# Patient Record
Sex: FEMALE | Race: BLACK OR AFRICAN AMERICAN | NOT HISPANIC OR LATINO | URBAN - METROPOLITAN AREA
[De-identification: names, ages, dates, MRNs, and addresses within clinical notes are randomized per-mention and may not be internally consistent; named-entity substitution may affect disease eponyms.]

---

## 2019-10-19 ENCOUNTER — EMERGENCY (EMERGENCY)
Facility: HOSPITAL | Age: 69
LOS: 1 days | Discharge: HOME | End: 2019-10-19
Attending: EMERGENCY MEDICINE
Payer: COMMERCIAL

## 2019-10-19 VITALS
WEIGHT: 192.02 LBS | DIASTOLIC BLOOD PRESSURE: 82 MMHG | HEART RATE: 62 BPM | SYSTOLIC BLOOD PRESSURE: 133 MMHG | RESPIRATION RATE: 18 BRPM | OXYGEN SATURATION: 100 %

## 2019-10-19 VITALS
OXYGEN SATURATION: 100 % | RESPIRATION RATE: 16 BRPM | SYSTOLIC BLOOD PRESSURE: 136 MMHG | TEMPERATURE: 98 F | HEART RATE: 62 BPM | DIASTOLIC BLOOD PRESSURE: 62 MMHG

## 2019-10-19 LAB
ALBUMIN SERPL ELPH-MCNC: 3.3 G/DL — LOW (ref 3.5–5)
ALP SERPL-CCNC: 69 U/L — SIGNIFICANT CHANGE UP (ref 40–120)
ALT FLD-CCNC: 19 U/L DA — SIGNIFICANT CHANGE UP (ref 10–60)
ANION GAP SERPL CALC-SCNC: 6 MMOL/L — SIGNIFICANT CHANGE UP (ref 5–17)
AST SERPL-CCNC: 11 U/L — SIGNIFICANT CHANGE UP (ref 10–40)
BASOPHILS # BLD AUTO: 0.02 K/UL — SIGNIFICANT CHANGE UP (ref 0–0.2)
BASOPHILS NFR BLD AUTO: 0.2 % — SIGNIFICANT CHANGE UP (ref 0–2)
BILIRUB SERPL-MCNC: 0.4 MG/DL — SIGNIFICANT CHANGE UP (ref 0.2–1.2)
BUN SERPL-MCNC: 24 MG/DL — HIGH (ref 7–18)
CALCIUM SERPL-MCNC: 9.1 MG/DL — SIGNIFICANT CHANGE UP (ref 8.4–10.5)
CHLORIDE SERPL-SCNC: 99 MMOL/L — SIGNIFICANT CHANGE UP (ref 96–108)
CO2 SERPL-SCNC: 31 MMOL/L — SIGNIFICANT CHANGE UP (ref 22–31)
CREAT SERPL-MCNC: 1.15 MG/DL — SIGNIFICANT CHANGE UP (ref 0.5–1.3)
EOSINOPHIL # BLD AUTO: 0.02 K/UL — SIGNIFICANT CHANGE UP (ref 0–0.5)
EOSINOPHIL NFR BLD AUTO: 0.2 % — SIGNIFICANT CHANGE UP (ref 0–6)
GLUCOSE SERPL-MCNC: 212 MG/DL — HIGH (ref 70–99)
HCT VFR BLD CALC: 40 % — SIGNIFICANT CHANGE UP (ref 34.5–45)
HGB BLD-MCNC: 12.9 G/DL — SIGNIFICANT CHANGE UP (ref 11.5–15.5)
IMM GRANULOCYTES NFR BLD AUTO: 0.3 % — SIGNIFICANT CHANGE UP (ref 0–1.5)
LYMPHOCYTES # BLD AUTO: 1.16 K/UL — SIGNIFICANT CHANGE UP (ref 1–3.3)
LYMPHOCYTES # BLD AUTO: 13.1 % — SIGNIFICANT CHANGE UP (ref 13–44)
MCHC RBC-ENTMCNC: 29 PG — SIGNIFICANT CHANGE UP (ref 27–34)
MCHC RBC-ENTMCNC: 32.3 GM/DL — SIGNIFICANT CHANGE UP (ref 32–36)
MCV RBC AUTO: 89.9 FL — SIGNIFICANT CHANGE UP (ref 80–100)
MONOCYTES # BLD AUTO: 0.49 K/UL — SIGNIFICANT CHANGE UP (ref 0–0.9)
MONOCYTES NFR BLD AUTO: 5.5 % — SIGNIFICANT CHANGE UP (ref 2–14)
NEUTROPHILS # BLD AUTO: 7.13 K/UL — SIGNIFICANT CHANGE UP (ref 1.8–7.4)
NEUTROPHILS NFR BLD AUTO: 80.7 % — HIGH (ref 43–77)
NRBC # BLD: 0 /100 WBCS — SIGNIFICANT CHANGE UP (ref 0–0)
PLATELET # BLD AUTO: 225 K/UL — SIGNIFICANT CHANGE UP (ref 150–400)
POTASSIUM SERPL-MCNC: 3.8 MMOL/L — SIGNIFICANT CHANGE UP (ref 3.5–5.3)
POTASSIUM SERPL-SCNC: 3.8 MMOL/L — SIGNIFICANT CHANGE UP (ref 3.5–5.3)
PROT SERPL-MCNC: 7.5 G/DL — SIGNIFICANT CHANGE UP (ref 6–8.3)
RBC # BLD: 4.45 M/UL — SIGNIFICANT CHANGE UP (ref 3.8–5.2)
RBC # FLD: 13.9 % — SIGNIFICANT CHANGE UP (ref 10.3–14.5)
SODIUM SERPL-SCNC: 136 MMOL/L — SIGNIFICANT CHANGE UP (ref 135–145)
TROPONIN I SERPL-MCNC: <0.015 NG/ML — SIGNIFICANT CHANGE UP (ref 0–0.04)
WBC # BLD: 8.85 K/UL — SIGNIFICANT CHANGE UP (ref 3.8–10.5)
WBC # FLD AUTO: 8.85 K/UL — SIGNIFICANT CHANGE UP (ref 3.8–10.5)

## 2019-10-19 PROCEDURE — 93005 ELECTROCARDIOGRAM TRACING: CPT

## 2019-10-19 PROCEDURE — 70450 CT HEAD/BRAIN W/O DYE: CPT | Mod: 26

## 2019-10-19 PROCEDURE — 99284 EMERGENCY DEPT VISIT MOD MDM: CPT | Mod: 25

## 2019-10-19 PROCEDURE — 36415 COLL VENOUS BLD VENIPUNCTURE: CPT

## 2019-10-19 PROCEDURE — 70450 CT HEAD/BRAIN W/O DYE: CPT

## 2019-10-19 PROCEDURE — 85027 COMPLETE CBC AUTOMATED: CPT

## 2019-10-19 PROCEDURE — 84484 ASSAY OF TROPONIN QUANT: CPT

## 2019-10-19 PROCEDURE — 99285 EMERGENCY DEPT VISIT HI MDM: CPT

## 2019-10-19 PROCEDURE — 80053 COMPREHEN METABOLIC PANEL: CPT

## 2019-10-19 PROCEDURE — 82962 GLUCOSE BLOOD TEST: CPT

## 2019-10-19 RX ORDER — INSULIN GLARGINE 100 [IU]/ML
0 INJECTION, SOLUTION SUBCUTANEOUS
Qty: 0 | Refills: 0 | DISCHARGE

## 2019-10-19 NOTE — ED PROVIDER NOTE - PATIENT PORTAL LINK FT
You can access the FollowMyHealth Patient Portal offered by [facility] by registering at the following website: [email or web address]. By joining [facility]’s FollowMyHealth portal, you will also be able to view your health information using other applications (apps) compatible with our system.

## 2019-10-19 NOTE — ED PROVIDER NOTE - OBJECTIVE STATEMENT
68 year old female PMh HTN, DM, HLD coming in with episode of hypoglycemia. As per pts faimly they heard patient fall from bed and called EMS. EMS found fingerstick to be 40 and gave IV glucose. after pt returned to baseline. at this time the pt denies all complaints. states this has happened before. for DM takes insulin, metformin, and trulicity. states no recent changes in DM meds. unsure if she at a substantial dinner.

## 2019-10-19 NOTE — ED PROVIDER NOTE - CLINICAL SUMMARY MEDICAL DECISION MAKING FREE TEXT BOX
68 year old female with hypoglycemia. vitals WNL. PE as above.  labs, ecg, ct head, reassess 68 year old female with hypoglycemia. vitals WNL. PE as above.  labs, ecg, ct head, reassess  labs are unremarkable. ecg with no acute ischemic changes. ct head negative. glucose WNL in ED. pt tolerating PO. advised maintin PO intake. dc home. f/u PMD. return precautions given.

## 2020-07-17 PROBLEM — Z00.00 ENCOUNTER FOR PREVENTIVE HEALTH EXAMINATION: Status: ACTIVE | Noted: 2020-07-17

## 2020-07-24 ENCOUNTER — APPOINTMENT (OUTPATIENT)
Dept: NEUROLOGY | Facility: CLINIC | Age: 70
End: 2020-07-24
Payer: MEDICARE

## 2020-07-24 VITALS
BODY MASS INDEX: 31.82 KG/M2 | WEIGHT: 191 LBS | SYSTOLIC BLOOD PRESSURE: 120 MMHG | DIASTOLIC BLOOD PRESSURE: 68 MMHG | TEMPERATURE: 98.1 F | OXYGEN SATURATION: 98 % | HEART RATE: 88 BPM | HEIGHT: 65 IN

## 2020-07-24 DIAGNOSIS — Z78.9 OTHER SPECIFIED HEALTH STATUS: ICD-10-CM

## 2020-07-24 DIAGNOSIS — Z82.49 FAMILY HISTORY OF ISCHEMIC HEART DISEASE AND OTHER DISEASES OF THE CIRCULATORY SYSTEM: ICD-10-CM

## 2020-07-24 DIAGNOSIS — Z86.79 PERSONAL HISTORY OF OTHER DISEASES OF THE CIRCULATORY SYSTEM: ICD-10-CM

## 2020-07-24 DIAGNOSIS — Z86.39 PERSONAL HISTORY OF OTHER ENDOCRINE, NUTRITIONAL AND METABOLIC DISEASE: ICD-10-CM

## 2020-07-24 DIAGNOSIS — Z83.3 FAMILY HISTORY OF DIABETES MELLITUS: ICD-10-CM

## 2020-07-24 DIAGNOSIS — R53.1 WEAKNESS: ICD-10-CM

## 2020-07-24 PROCEDURE — 99205 OFFICE O/P NEW HI 60 MIN: CPT

## 2020-07-24 NOTE — CONSULT LETTER
[Dear  ___] : Dear  [unfilled], [Consult Letter:] : I had the pleasure of evaluating your patient, [unfilled]. [Please see my note below.] : Please see my note below. [Consult Closing:] : Thank you very much for allowing me to participate in the care of this patient.  If you have any questions, please do not hesitate to contact me. [Sincerely,] : Sincerely, [FreeTextEntry3] : Kacie Corrales MD, MPH\par

## 2020-07-24 NOTE — DATA REVIEWED
[de-identified] : [facility] images reviwe 2019: WMID\par Ventricular Rate 53 BPM\par \par per report mri  brain 2018: small left cerebella [id number]: severe cts bl\par Atrial Rate 53 BPM\par \par P-R Interval 162 ms\par \par QRS Duration 82 ms\par \par Q-T Interval 504 ms\par \par QTC Calculation(Bezet) 472 ms\par \par P Axis 46 degrees\par \par R Axis -4 degrees\par \par T Axis 13 degrees\par \par Diagnosis Line *** Poor data quality, interpretation may be adversely affected\par Sinus bradycardia\par borderline QT\par Minimal voltage criteria for LVH, may be normal variant\par Borderline ECG\par \par Confirmed by RUKHSANA WATERMAN, JULIANNE ([id number]) on 10/21/2019 1:21:37 PM\par \par Glu 2019:212

## 2020-07-24 NOTE — HISTORY OF PRESENT ILLNESS
[FreeTextEntry1] : The patient has DM and here to establish care.  The patient had an event of LOC about 20+years ago, she was taken to [facility] and was told that she had a stroke.  She was started on Plavix, she is also on Pravastatin 5mg for stroke prevention.  She has not had any further stroke symptoms.\par \par She is right handed and has had numbness and tingling in both hands for years.  She also has cramping in the hands as well as right forearm pain.  She has received injections in her hands without improvement.  She notes that her hands are worsening.

## 2020-07-24 NOTE — ASSESSMENT
[FreeTextEntry1] : Lacunar stroke\par will check LDL and HbA1C\par will cw Plavix and Provastatin 5mg for now, will adjust statin dose if needed based on labs\par \par Bilateral numbness and tingling in the hands, found to have bl APB weakness, also c/o right arm pain, concerning for CTS\par will get ncs.emg and will direct care bases on results

## 2020-07-24 NOTE — REVIEW OF SYSTEMS
[Eyesight Problems] : eyesight problems [As Noted in HPI] : as noted in HPI [Fever] : no fever [Loss Of Hearing] : no hearing loss [Anxiety] : no anxiety [Chest Pain] : no chest pain [Shortness Of Breath] : no shortness of breath [Vomiting] : no vomiting [Itching] : no itching [Incontinence] : no incontinence [Joint Pain] : no joint pain [Muscle Weakness] : no muscle weakness [Easy Bleeding] : no tendency for easy bleeding

## 2020-07-24 NOTE — PHYSICAL EXAM
[General Appearance - In No Acute Distress] : in no acute distress [General Appearance - Alert] : alert [Person] : oriented to person [Place] : oriented to place [Concentration Intact] : normal concentrating ability [Time] : oriented to time [Registration Intact] : recent registration memory intact [Repeating Phrases] : no difficulty repeating a phrase [Naming Objects] : no difficulty naming common objects [Visual Intact] : visual attention was ~T not ~L decreased [Comprehension] : comprehension intact [Vocabulary] : adequate range of vocabulary [Fluency] : fluency intact [Cranial Nerves Oculomotor (III)] : extraocular motion intact [Cranial Nerves Facial (VII)] : face symmetrical [Cranial Nerves Trigeminal (V)] : facial sensation intact symmetrically [Cranial Nerves Optic (II)] : visual acuity intact bilaterally,  visual fields full to confrontation, pupils equal round and reactive to light [Cranial Nerves Hypoglossal (XII)] : there was no tongue deviation with protrusion [Cranial Nerves Vestibulocochlear (VIII)] : hearing was intact bilaterally [Cranial Nerves Accessory (XI - Cranial And Spinal)] : head turning and shoulder shrug symmetric [Cranial Nerves Glossopharyngeal (IX)] : tongue and palate midline [Motor Tone] : muscle tone was normal in all four extremities [Involuntary Movements] : no involuntary movements were seen [Sensation Tactile Decrease] : light touch was intact [Abnormal Walk] : normal gait [Balance] : balance was intact [1+] : Ankle jerk left 1+ [Full Pulse] : the pedal pulses are present [Coordination - Dysmetria Impaired Finger-to-Nose Bilateral] : not present [Plantar Reflex Right Only] : normal on the right [Coordination - Dysmetria Impaired Heel-to-Shin Bilateral] : not present [Plantar Reflex Left Only] : normal on the left [FreeTextEntry6] : strength full except bl APB 4/5 [FreeTextEntry5] : fundi not visualized

## 2020-07-27 LAB
CHOLEST SERPL-MCNC: 103 MG/DL
CHOLEST/HDLC SERPL: 2.5 RATIO
ESTIMATED AVERAGE GLUCOSE: 180 MG/DL
HBA1C MFR BLD HPLC: 7.9 %
HDLC SERPL-MCNC: 42 MG/DL
LDLC SERPL CALC-MCNC: 45 MG/DL
TRIGL SERPL-MCNC: 82 MG/DL

## 2020-10-22 ENCOUNTER — APPOINTMENT (OUTPATIENT)
Dept: NEUROLOGY | Facility: CLINIC | Age: 70
End: 2020-10-22
Payer: MEDICARE

## 2020-10-22 VITALS
WEIGHT: 191 LBS | DIASTOLIC BLOOD PRESSURE: 74 MMHG | HEIGHT: 65 IN | TEMPERATURE: 97.2 F | SYSTOLIC BLOOD PRESSURE: 158 MMHG | OXYGEN SATURATION: 100 % | HEART RATE: 77 BPM | BODY MASS INDEX: 31.82 KG/M2

## 2020-10-22 PROCEDURE — 95886 MUSC TEST DONE W/N TEST COMP: CPT | Mod: 50

## 2020-10-22 PROCEDURE — 95913 NRV CNDJ TEST 13/> STUDIES: CPT

## 2020-10-29 ENCOUNTER — APPOINTMENT (OUTPATIENT)
Dept: NEUROLOGY | Facility: CLINIC | Age: 70
End: 2020-10-29
Payer: MEDICARE

## 2020-10-29 VITALS — OXYGEN SATURATION: 98 % | DIASTOLIC BLOOD PRESSURE: 69 MMHG | HEART RATE: 69 BPM | SYSTOLIC BLOOD PRESSURE: 133 MMHG

## 2020-10-29 VITALS
BODY MASS INDEX: 32.65 KG/M2 | HEIGHT: 65 IN | WEIGHT: 196 LBS | DIASTOLIC BLOOD PRESSURE: 76 MMHG | HEART RATE: 75 BPM | SYSTOLIC BLOOD PRESSURE: 137 MMHG | TEMPERATURE: 98.3 F

## 2020-10-29 VITALS — DIASTOLIC BLOOD PRESSURE: 68 MMHG | HEART RATE: 89 BPM | OXYGEN SATURATION: 98 % | SYSTOLIC BLOOD PRESSURE: 127 MMHG

## 2020-10-29 VITALS — DIASTOLIC BLOOD PRESSURE: 67 MMHG | HEART RATE: 70 BPM | SYSTOLIC BLOOD PRESSURE: 125 MMHG | OXYGEN SATURATION: 98 %

## 2020-10-29 DIAGNOSIS — N39.9 DISORDER OF URINARY SYSTEM, UNSPECIFIED: ICD-10-CM

## 2020-10-29 PROCEDURE — 99215 OFFICE O/P EST HI 40 MIN: CPT

## 2020-10-29 PROCEDURE — 99072 ADDL SUPL MATRL&STAF TM PHE: CPT

## 2020-10-29 NOTE — DATA REVIEWED
[de-identified] : ncs/emg: moderate CTS with demyelinating features bilaterally\par LDL 45, HbA1C 7.9

## 2020-10-29 NOTE — HISTORY OF PRESENT ILLNESS
[FreeTextEntry1] : The patient has DM and here to establish care. The patient had an event of LOC about 20+years ago, she was taken to [facility] and was told that she had a stroke. She was started on Plavix, she is also on Pravastatin 5mg for stroke prevention. She has not had any further stroke symptoms.\par \par She is right handed and has had numbness and tingling in both hands for years. She also has cramping in the hands as well as right forearm pain. She has received injections in her hands without improvement. She notes that her hands are worsening. \par \par The patient also feels dizziness without any focal neurological signs.  Started few weeks ago, noted on walking.  Drinks about 4 cups of water daily.  Has been cards for the dizziness and has not been noted to have any cardiac reasons for the dizziness.

## 2020-10-29 NOTE — PHYSICAL EXAM
[General Appearance - Alert] : alert [General Appearance - In No Acute Distress] : in no acute distress [Person] : oriented to person [Place] : oriented to place [Time] : oriented to time [Registration Intact] : recent registration memory intact [Concentration Intact] : normal concentrating ability [Naming Objects] : no difficulty naming common objects [Repeating Phrases] : no difficulty repeating a phrase [Fluency] : fluency intact [Comprehension] : comprehension intact [Vocabulary] : adequate range of vocabulary [Cranial Nerves Optic (II)] : visual acuity intact bilaterally,  visual fields full to confrontation, pupils equal round and reactive to light [Cranial Nerves Oculomotor (III)] : extraocular motion intact [Cranial Nerves Trigeminal (V)] : facial sensation intact symmetrically [Cranial Nerves Facial (VII)] : face symmetrical [Motor Tone] : muscle tone was normal in all four extremities [Motor Strength] : muscle strength was normal in all four extremities [Sensation Tactile Decrease] : light touch was intact [Abnormal Walk] : normal gait [Balance] : balance was intact [Coordination - Dysmetria Impaired Finger-to-Nose Bilateral] : not present [Coordination - Dysmetria Impaired Heel-to-Shin Bilateral] : not present

## 2020-10-29 NOTE — ASSESSMENT
[FreeTextEntry1] : Lacunar stroke\par LDL at goal, HbA1C elevated\par will cw Plavix and Pravastatin 5mg\par \par Bilateral numbness and tingling in the hands, found to have bl APB weakness, also c/o right arm pain with ncs.emg showing moderate CTS with demyelinating features bilaterally.  WIll refer to hand surgery for median nerve release.\par \par Dizziness, likely due to orthostasis, encouraged increased water intake.  Will get MRI brain and CD to r/o cva and hypoperfusion.\par \par The patient has increased urination at night, will refer to urology.\par \par

## 2020-10-30 ENCOUNTER — APPOINTMENT (OUTPATIENT)
Dept: VASCULAR SURGERY | Facility: CLINIC | Age: 70
End: 2020-10-30
Payer: MEDICARE

## 2020-10-30 VITALS
BODY MASS INDEX: 32.65 KG/M2 | WEIGHT: 196 LBS | SYSTOLIC BLOOD PRESSURE: 165 MMHG | DIASTOLIC BLOOD PRESSURE: 66 MMHG | HEIGHT: 65 IN | HEART RATE: 76 BPM

## 2020-10-30 VITALS — TEMPERATURE: 97.2 F

## 2020-10-30 DIAGNOSIS — M25.473 EFFUSION, UNSPECIFIED ANKLE: ICD-10-CM

## 2020-10-30 DIAGNOSIS — Z86.39 PERSONAL HISTORY OF OTHER ENDOCRINE, NUTRITIONAL AND METABOLIC DISEASE: ICD-10-CM

## 2020-10-30 DIAGNOSIS — Z86.73 PERSONAL HISTORY OF TRANSIENT ISCHEMIC ATTACK (TIA), AND CEREBRAL INFARCTION W/OUT RESIDUAL DEFICITS: ICD-10-CM

## 2020-10-30 PROCEDURE — 99203 OFFICE O/P NEW LOW 30 MIN: CPT

## 2020-10-30 PROCEDURE — 99072 ADDL SUPL MATRL&STAF TM PHE: CPT

## 2020-10-30 PROCEDURE — 93970 EXTREMITY STUDY: CPT

## 2020-10-30 NOTE — ASSESSMENT
[FreeTextEntry1] : 68 yo female with history of dm, htn, tia, venous insufficiency s/p b/l gsv rf ablation in the past presents for evaluation of persistent lower extremity edema and pain. \par \par venous duplex shows no evidence of dvt, svt or deep insufficiency, gsv s/p ablation b/l thigh with short segment of reflux of the right proximal calf and varicose veins in the left calf\par \par at this time would recommend compression and elevation \par pt to follow up as needed

## 2020-10-30 NOTE — CONSULT LETTER
[Dear  ___] : Dear  [unfilled], [Consult Letter:] : I had the pleasure of evaluating your patient, [unfilled]. [Please see my note below.] : Please see my note below. [Consult Closing:] : Thank you very much for allowing me to participate in the care of this patient.  If you have any questions, please do not hesitate to contact me. [Sincerely,] : Sincerely, [FreeTextEntry3] : Maximiliano Benton M.D., F.GUALBERTOS., R.P.ANJALI.I.\par  of Vascular Surgery\par Assistant Professor of Radiology\par Director of Endovascular Program/ Vascular Access Center\par [facility]

## 2020-10-30 NOTE — PHYSICAL EXAM
[2+] : left 2+ [Ankle Swelling (On Exam)] : present [Ankle Swelling On The Right] : mild [] : bilaterally [Ankle Swelling Bilaterally] : severe [No Rash or Lesion] : No rash or lesion [Alert] : alert [Calm] : calm [JVD] : no jugular venous distention  [Normal Breath Sounds] : Normal breath sounds [Normal Heart Sounds] : normal heart sounds [Varicose Veins Of Lower Extremities] : not present [Abdomen Masses] : No abdominal masses [Skin Ulcer] : no ulcer [de-identified] : appears well  [de-identified] : no calf tenderness \par motor intact b/l lower extremities, muscle strength 5/5 with dorsi and plantar flexion\par  [de-identified] : sensation intact b/l lower extremities

## 2020-10-30 NOTE — HISTORY OF PRESENT ILLNESS
[FreeTextEntry1] : 68 yo female with history of dm, htn, tia, venous insufficiency s/p b/l gsv rf ablation in the past presents for evaluation of persistent lower extremity edema and pain.  pt states that she is starting to feel the same pain that she was having prior to the ablations.  she reports occasional cramping in the lower extremities and a fatigued felling in the lower extremities.  pt denies any history of dvt, ulcers or bleeding.  pt states that she elevates her legs at night with only minimal relief.  she does not wear compression stockings.\par pt states that she is able to walk about 2 blocks prior to feeling fatigued.  pt denies any cramping pain when she ambulates or pain that wakes her in the evening

## 2020-11-05 ENCOUNTER — APPOINTMENT (OUTPATIENT)
Dept: UROLOGY | Facility: CLINIC | Age: 70
End: 2020-11-05
Payer: MEDICARE

## 2020-11-05 VITALS
BODY MASS INDEX: 31.99 KG/M2 | HEIGHT: 65 IN | TEMPERATURE: 98.3 F | DIASTOLIC BLOOD PRESSURE: 66 MMHG | HEART RATE: 82 BPM | WEIGHT: 192 LBS | SYSTOLIC BLOOD PRESSURE: 119 MMHG

## 2020-11-05 DIAGNOSIS — Z86.39 PERSONAL HISTORY OF OTHER ENDOCRINE, NUTRITIONAL AND METABOLIC DISEASE: ICD-10-CM

## 2020-11-05 DIAGNOSIS — R35.1 NOCTURIA: ICD-10-CM

## 2020-11-05 PROCEDURE — 99203 OFFICE O/P NEW LOW 30 MIN: CPT

## 2020-11-05 PROCEDURE — 99072 ADDL SUPL MATRL&STAF TM PHE: CPT

## 2020-11-08 NOTE — ASSESSMENT
[FreeTextEntry1] : 70 yo F with nocturia\par \par - PVR = 0ml\par - Discussed possible etiologies for LUTS. Discussed ways to manage them including behavioral modifications such as adequate hydration, controlling constipation, restricting fluids in the evening. Also discussed changing timing of her lasix as this seems to be contirbuting to her symptoms\par - UA, culture

## 2020-11-08 NOTE — HISTORY OF PRESENT ILLNESS
[FreeTextEntry1] : 70 yo F presents with nocturia\par Voids 3 times per day, but nocturia 4-5 times/night\par Notices that when she doesn't take her lasix, nocturia much improved\par No other significant LUTS, no incontinence\par no dysuria, no gross hematuria\par no recurrent UTI\par Drinks 4 cups of water, 1 cup of tea daily\par normal bowel movements\par 2 children, \par LMP = 10 yrs ago

## 2020-11-08 NOTE — PHYSICAL EXAM
[General Appearance - Well Developed] : well developed [General Appearance - Well Nourished] : well nourished [Normal Appearance] : normal appearance [Well Groomed] : well groomed [General Appearance - In No Acute Distress] : no acute distress [Edema] : no peripheral edema [Respiration, Rhythm And Depth] : normal respiratory rhythm and effort [Exaggerated Use Of Accessory Muscles For Inspiration] : no accessory muscle use [Abdomen Soft] : soft [Abdomen Tenderness] : non-tender [Costovertebral Angle Tenderness] : no ~M costovertebral angle tenderness [Urethral Meatus] : normal urethra [Urinary Bladder Findings] : the bladder was normal on palpation [External Female Genitalia] : normal external genitalia [Normal Station and Gait] : the gait and station were normal for the patient's age [] : no rash [No Focal Deficits] : no focal deficits [Oriented To Time, Place, And Person] : oriented to person, place, and time [Affect] : the affect was normal [Mood] : the mood was normal [Not Anxious] : not anxious [No Palpable Adenopathy] : no palpable adenopathy [FreeTextEntry1] : mild vaginal atrophy, neg CST, no prolapse

## 2020-11-30 LAB
APPEARANCE: CLEAR
BACTERIA UR CULT: NORMAL
BACTERIA: NEGATIVE
BILIRUBIN URINE: NEGATIVE
BLOOD URINE: NEGATIVE
COLOR: YELLOW
GLUCOSE QUALITATIVE U: NEGATIVE
HYALINE CASTS: 2 /LPF
KETONES URINE: NEGATIVE
LEUKOCYTE ESTERASE URINE: NEGATIVE
MICROSCOPIC-UA: NORMAL
NITRITE URINE: NEGATIVE
PH URINE: 7.5
PROTEIN URINE: NORMAL
RED BLOOD CELLS URINE: 1 /HPF
SPECIFIC GRAVITY URINE: 1.02
SQUAMOUS EPITHELIAL CELLS: 1 /HPF
UROBILINOGEN URINE: NORMAL
WHITE BLOOD CELLS URINE: 1 /HPF

## 2021-04-23 ENCOUNTER — APPOINTMENT (OUTPATIENT)
Dept: VASCULAR SURGERY | Facility: CLINIC | Age: 71
End: 2021-04-23
Payer: MEDICARE

## 2021-04-23 VITALS
SYSTOLIC BLOOD PRESSURE: 146 MMHG | DIASTOLIC BLOOD PRESSURE: 69 MMHG | BODY MASS INDEX: 33.32 KG/M2 | HEART RATE: 65 BPM | HEIGHT: 65 IN | WEIGHT: 200 LBS

## 2021-04-23 PROCEDURE — 99072 ADDL SUPL MATRL&STAF TM PHE: CPT

## 2021-04-23 PROCEDURE — 93924 LWR XTR VASC STDY BILAT: CPT

## 2021-04-23 PROCEDURE — 99213 OFFICE O/P EST LOW 20 MIN: CPT

## 2021-04-23 NOTE — PHYSICAL EXAM
[2+] : right 2+ [1+] : left 1+ [Ankle Swelling (On Exam)] : present [Ankle Swelling On The Right] : mild [] : bilaterally [Ankle Swelling Bilaterally] : severe [No Rash or Lesion] : No rash or lesion [Alert] : alert [Calm] : calm [JVD] : no jugular venous distention  [Varicose Veins Of Lower Extremities] : not present [Skin Ulcer] : no ulcer [de-identified] : appears well  [de-identified] : no calf tenderness

## 2021-04-23 NOTE — ASSESSMENT
[FreeTextEntry1] : 69 yo female with history of dm, htn, tia, venous insufficiency s/p b/l gsv rf ablation in the past presents for follow up now experiencing pain and cramping in the lower extremities\par \par robinson/pvr with exercise shows no evidence of arterial disease with robinson of > 1 b/l at rest and decrease to 0.96 on the right after exercise \par \par no vascular surgical intervention at this time \par pt to follow up as needed

## 2021-04-23 NOTE — HISTORY OF PRESENT ILLNESS
[FreeTextEntry1] : 69 yo female with history of dm, htn, tia, venous insufficiency s/p b/l gsv rf ablation in the past presents for follow up now experiencing pain and cramping in the lower extremities.  pt states that the symptoms are worse in the left lower extremity.  pt reports a cramping pain from the left gluteus to the posterior left thigh.  pt states that with walking she feels dizzy but denies any recent stroke like symptoms

## 2021-05-07 ENCOUNTER — APPOINTMENT (OUTPATIENT)
Dept: NEUROLOGY | Facility: CLINIC | Age: 71
End: 2021-05-07
Payer: MEDICARE

## 2021-05-07 VITALS
HEIGHT: 65 IN | OXYGEN SATURATION: 97 % | HEART RATE: 73 BPM | BODY MASS INDEX: 33.66 KG/M2 | TEMPERATURE: 97.3 F | SYSTOLIC BLOOD PRESSURE: 126 MMHG | WEIGHT: 202 LBS | DIASTOLIC BLOOD PRESSURE: 59 MMHG

## 2021-05-07 DIAGNOSIS — M54.9 DORSALGIA, UNSPECIFIED: ICD-10-CM

## 2021-05-07 PROCEDURE — 99214 OFFICE O/P EST MOD 30 MIN: CPT

## 2021-05-07 PROCEDURE — 99072 ADDL SUPL MATRL&STAF TM PHE: CPT

## 2021-05-07 NOTE — PHYSICAL EXAM
[General Appearance - Alert] : alert [General Appearance - In No Acute Distress] : in no acute distress [Person] : oriented to person [Place] : oriented to place [Time] : oriented to time [Registration Intact] : recent registration memory intact [Concentration Intact] : normal concentrating ability [Naming Objects] : no difficulty naming common objects [Fluency] : fluency intact [Vocabulary] : adequate range of vocabulary [Motor Tone] : muscle tone was normal in all four extremities [Motor Strength] : muscle strength was normal in all four extremities [Sensation Tactile Decrease] : light touch was intact [Abnormal Walk] : normal gait [Balance] : balance was intact

## 2021-05-07 NOTE — HISTORY OF PRESENT ILLNESS
[FreeTextEntry1] : The patient has DM and here to establish care. The patient had an event of LOC about 20+years ago, she was taken to [facility] and was told that she had a stroke. She was started on Plavix, she is also on Pravastatin 5mg for stroke prevention. She has not had any further stroke symptoms.\par \par She is right handed and has had numbness and tingling in both hands for years. She also has cramping in the hands as well as right forearm pain. She has received injections in her hands without improvement. She notes that her hands are worsening. She has note been able to get to hand specialist yet.\par \par The patient also feels dizziness without any focal neurological signs. Started few weeks ago, noted on walking. Drinks about 4 cups of water daily. Has been cards for the dizziness and has not been noted to have any cardiac reasons for the dizziness. \par

## 2021-05-07 NOTE — DATA REVIEWED
[de-identified] : chronic left cerebellar cva [de-identified] : urology note appreciated\par UA -rosie\par cd no stenosis

## 2021-05-07 NOTE — ASSESSMENT
[FreeTextEntry1] : chronic left cerebellar cva, Lacunar stroke\par LDL at goal, HbA1C elevated\par will cw Plavix and Pravastatin 5mg\par \par Bilateral numbness and tingling in the hands, found to have bl APB weakness, also c/o right arm pain with ncs.emg showing moderate CTS with demyelinating features bilaterally. WIll refer to hand surgery for median nerve release.\par \par Dizziness, likely due to orthostasis, encouraged increased water intake. MRI brain and CD don't explain the symptoms.\par

## 2021-05-28 ENCOUNTER — APPOINTMENT (OUTPATIENT)
Dept: ORTHOPEDIC SURGERY | Facility: CLINIC | Age: 71
End: 2021-05-28
Payer: MEDICARE

## 2021-05-28 VITALS
HEIGHT: 65 IN | SYSTOLIC BLOOD PRESSURE: 171 MMHG | DIASTOLIC BLOOD PRESSURE: 75 MMHG | WEIGHT: 202 LBS | HEART RATE: 65 BPM | OXYGEN SATURATION: 99 % | BODY MASS INDEX: 33.66 KG/M2

## 2021-05-28 PROCEDURE — 99072 ADDL SUPL MATRL&STAF TM PHE: CPT

## 2021-05-28 PROCEDURE — 99204 OFFICE O/P NEW MOD 45 MIN: CPT

## 2021-05-28 PROCEDURE — 73110 X-RAY EXAM OF WRIST: CPT | Mod: 50

## 2021-06-09 ENCOUNTER — OUTPATIENT (OUTPATIENT)
Dept: OUTPATIENT SERVICES | Facility: HOSPITAL | Age: 71
LOS: 1 days | End: 2021-06-09
Payer: MEDICARE

## 2021-06-09 VITALS
WEIGHT: 205.03 LBS | RESPIRATION RATE: 16 BRPM | OXYGEN SATURATION: 98 % | DIASTOLIC BLOOD PRESSURE: 76 MMHG | HEIGHT: 65 IN | TEMPERATURE: 97 F | SYSTOLIC BLOOD PRESSURE: 152 MMHG | HEART RATE: 78 BPM

## 2021-06-09 DIAGNOSIS — Z98.891 HISTORY OF UTERINE SCAR FROM PREVIOUS SURGERY: Chronic | ICD-10-CM

## 2021-06-09 DIAGNOSIS — I63.9 CEREBRAL INFARCTION, UNSPECIFIED: ICD-10-CM

## 2021-06-09 DIAGNOSIS — G56.00 CARPAL TUNNEL SYNDROME, UNSPECIFIED UPPER LIMB: ICD-10-CM

## 2021-06-09 DIAGNOSIS — G56.03 CARPAL TUNNEL SYNDROME, BILATERAL UPPER LIMBS: ICD-10-CM

## 2021-06-09 DIAGNOSIS — E11.9 TYPE 2 DIABETES MELLITUS WITHOUT COMPLICATIONS: ICD-10-CM

## 2021-06-09 DIAGNOSIS — Z01.818 ENCOUNTER FOR OTHER PREPROCEDURAL EXAMINATION: ICD-10-CM

## 2021-06-09 LAB
A1C WITH ESTIMATED AVERAGE GLUCOSE RESULT: 6.6 % — HIGH (ref 4–5.6)
ANION GAP SERPL CALC-SCNC: 15 MMOL/L — SIGNIFICANT CHANGE UP (ref 5–17)
BUN SERPL-MCNC: 33 MG/DL — HIGH (ref 7–23)
CALCIUM SERPL-MCNC: 9.9 MG/DL — SIGNIFICANT CHANGE UP (ref 8.4–10.5)
CHLORIDE SERPL-SCNC: 101 MMOL/L — SIGNIFICANT CHANGE UP (ref 96–108)
CO2 SERPL-SCNC: 27 MMOL/L — SIGNIFICANT CHANGE UP (ref 22–31)
CREAT SERPL-MCNC: 1.29 MG/DL — SIGNIFICANT CHANGE UP (ref 0.5–1.3)
ESTIMATED AVERAGE GLUCOSE: 143 MG/DL — HIGH (ref 68–114)
GLUCOSE SERPL-MCNC: 91 MG/DL — SIGNIFICANT CHANGE UP (ref 70–99)
HCT VFR BLD CALC: 37.9 % — SIGNIFICANT CHANGE UP (ref 34.5–45)
HGB BLD-MCNC: 11.7 G/DL — SIGNIFICANT CHANGE UP (ref 11.5–15.5)
MCHC RBC-ENTMCNC: 28.7 PG — SIGNIFICANT CHANGE UP (ref 27–34)
MCHC RBC-ENTMCNC: 30.9 GM/DL — LOW (ref 32–36)
MCV RBC AUTO: 93.1 FL — SIGNIFICANT CHANGE UP (ref 80–100)
NRBC # BLD: 0 /100 WBCS — SIGNIFICANT CHANGE UP (ref 0–0)
PLATELET # BLD AUTO: 195 K/UL — SIGNIFICANT CHANGE UP (ref 150–400)
POTASSIUM SERPL-MCNC: 3.6 MMOL/L — SIGNIFICANT CHANGE UP (ref 3.5–5.3)
POTASSIUM SERPL-SCNC: 3.6 MMOL/L — SIGNIFICANT CHANGE UP (ref 3.5–5.3)
RBC # BLD: 4.07 M/UL — SIGNIFICANT CHANGE UP (ref 3.8–5.2)
RBC # FLD: 14.6 % — HIGH (ref 10.3–14.5)
SODIUM SERPL-SCNC: 143 MMOL/L — SIGNIFICANT CHANGE UP (ref 135–145)
WBC # BLD: 8.38 K/UL — SIGNIFICANT CHANGE UP (ref 3.8–10.5)
WBC # FLD AUTO: 8.38 K/UL — SIGNIFICANT CHANGE UP (ref 3.8–10.5)

## 2021-06-09 PROCEDURE — G0463: CPT

## 2021-06-09 PROCEDURE — 83036 HEMOGLOBIN GLYCOSYLATED A1C: CPT

## 2021-06-09 PROCEDURE — 80048 BASIC METABOLIC PNL TOTAL CA: CPT

## 2021-06-09 PROCEDURE — 85027 COMPLETE CBC AUTOMATED: CPT

## 2021-06-09 RX ORDER — CHLORTHALIDONE 50 MG
1 TABLET ORAL
Qty: 0 | Refills: 0 | DISCHARGE

## 2021-06-09 RX ORDER — CARVEDILOL PHOSPHATE 80 MG/1
0 CAPSULE, EXTENDED RELEASE ORAL
Qty: 0 | Refills: 0 | DISCHARGE

## 2021-06-09 RX ORDER — BUMETANIDE 0.25 MG/ML
1 INJECTION INTRAMUSCULAR; INTRAVENOUS
Qty: 0 | Refills: 0 | DISCHARGE

## 2021-06-09 RX ORDER — LIDOCAINE HCL 20 MG/ML
0.2 VIAL (ML) INJECTION ONCE
Refills: 0 | Status: DISCONTINUED | OUTPATIENT
Start: 2021-06-17 | End: 2021-07-01

## 2021-06-09 RX ORDER — SODIUM CHLORIDE 9 MG/ML
3 INJECTION INTRAMUSCULAR; INTRAVENOUS; SUBCUTANEOUS EVERY 8 HOURS
Refills: 0 | Status: DISCONTINUED | OUTPATIENT
Start: 2021-06-17 | End: 2021-07-01

## 2021-06-09 RX ORDER — RANITIDINE HYDROCHLORIDE 150 MG/1
0 TABLET, FILM COATED ORAL
Qty: 0 | Refills: 0 | DISCHARGE

## 2021-06-09 RX ORDER — EZETIMIBE 10 MG/1
1 TABLET ORAL
Qty: 0 | Refills: 0 | DISCHARGE

## 2021-06-09 RX ORDER — DULAGLUTIDE 4.5 MG/.5ML
0 INJECTION, SOLUTION SUBCUTANEOUS
Qty: 0 | Refills: 0 | DISCHARGE

## 2021-06-09 RX ORDER — NIFEDIPINE 30 MG
1 TABLET, EXTENDED RELEASE 24 HR ORAL
Qty: 0 | Refills: 0 | DISCHARGE

## 2021-06-09 RX ORDER — ROSUVASTATIN CALCIUM 5 MG/1
1 TABLET ORAL
Qty: 0 | Refills: 0 | DISCHARGE

## 2021-06-09 RX ORDER — CLOPIDOGREL BISULFATE 75 MG/1
1 TABLET, FILM COATED ORAL
Qty: 0 | Refills: 0 | DISCHARGE

## 2021-06-09 RX ORDER — INSULIN LISPRO 100/ML
10 VIAL (ML) SUBCUTANEOUS
Qty: 0 | Refills: 0 | DISCHARGE

## 2021-06-09 RX ORDER — POTASSIUM CHLORIDE 20 MEQ
0 PACKET (EA) ORAL
Qty: 0 | Refills: 0 | DISCHARGE

## 2021-06-09 RX ORDER — LOSARTAN POTASSIUM 100 MG/1
1 TABLET, FILM COATED ORAL
Qty: 0 | Refills: 0 | DISCHARGE

## 2021-06-09 RX ORDER — METFORMIN HYDROCHLORIDE 850 MG/1
1 TABLET ORAL
Qty: 0 | Refills: 0 | DISCHARGE

## 2021-06-09 RX ORDER — INSULIN GLARGINE 100 [IU]/ML
55 INJECTION, SOLUTION SUBCUTANEOUS
Qty: 0 | Refills: 0 | DISCHARGE

## 2021-06-09 NOTE — H&P PST ADULT - NSSUBSTANCEUSE_GEN_ALL_CORE_SD
PROCEDURE:  CT HEAD WITHOUT CONTRAST.



HISTORY:

AMS



COMPARISON:

None available. 



TECHNIQUE:

Axial computed tomography images were obtained through the head/brain 

without intravenous contrast.  



Radiation dose:



Total exam DLP = 1727 mGy-cm.



This CT exam was performed using one or more of the following dose 

reduction techniques: Automated exposure control, adjustment of the 

mA and/or kV according to patient size, and/or use of iterative 

reconstruction technique.



FINDINGS:



HEMORRHAGE:

No intracranial hemorrhage. 



BRAIN:

No mass effect or edema.  No atrophy or chronic microvascular 

ischemic changes.



VENTRICLES:

Unremarkable. No hydrocephalus. 



CALVARIUM:

Unremarkable.



PARANASAL SINUSES:

Unremarkable as visualized. No significant inflammatory changes.



MASTOID AIR CELLS:

Unremarkable as visualized. No inflammatory changes.



OTHER FINDINGS:

None.



IMPRESSION:

No acute findings never used

## 2021-06-09 NOTE — H&P PST ADULT - ASSESSMENT
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     ***S/P Covid vaccine , second dose moderna on  04/07 /2021.

## 2021-06-09 NOTE — H&P PST ADULT - NSANTHOSAYNRD_GEN_A_CORE
No. KATRINA screening performed.  STOP BANG Legend: 0-2 = LOW Risk; 3-4 = INTERMEDIATE Risk; 5-8 = HIGH Risk

## 2021-06-09 NOTE — H&P PST ADULT - OTHER CARE PROVIDERS
Card: Dr David Elias , preop visit 5/2021, neuro Dr Corrales  last visit 05/07/21, Endo: Dr Alfaro  F/U 3 months

## 2021-06-09 NOTE — H&P PST ADULT - HISTORY OF PRESENT ILLNESS
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     ***S/P Covid vaccine , second dose moderna on  04/07 /2021.    Denies fever, cough, shortness of breadth, diarrhea, throat pain, changes in taste/smell or any rash.

## 2021-06-09 NOTE — H&P PST ADULT - NSICDXPASTMEDICALHX_GEN_ALL_CORE_FT
PAST MEDICAL HISTORY:  Bilateral ankle joint pain denies gout    DM (diabetes mellitus) 2    H/O neuropathy     HLD (hyperlipidemia)     HTN (hypertension)     HTN (hypertension)     Stroke loc > 20 years ago  by syncopal episode for 2 hours  admitted in [facility], for 5 days, discharged & sent to PMD  & started on PLAVIX    Venous (peripheral) insufficiency     Venous insufficiency

## 2021-06-09 NOTE — H&P PST ADULT - NSICDXPROBLEM_GEN_ALL_CORE_FT
PROBLEM DIAGNOSES  Problem: Carpal tunnel syndrome  Assessment and Plan: , s/p surgical consult, scheduled for right endoscopic carpal tunnel release on 06/17/2021.     ***S/P Covid vaccine , second dose moderna on  04/07 /2021.    Problem: DM (diabetes mellitus)  Assessment and Plan: Will follow up with labs/ fingerstick. No insulin in AM of surgery. Preop HgA1c ordered Instructed to hold metformin/ ploglitazone ( last dose on 6/16AM  . STAT FS  on the day of surgery ordered.   On Endo follow uo every 3 months    Problem: Stroke  Assessment and Plan: Instructed to continue meds, last dose Plavix on today 6/9( patient states surgeon advised to be off Plavix a week before surgery)advised to take  asprin 81 mgs( H/O STROKE/PVD with venous insuffiency)  &  take Asprin with sips of water in AM the day of surgery . Advised patient to confirm with cardiologist about it. patient states she is getting medical clearence from cardiologist for this surgery.       PROBLEM DIAGNOSES  Problem: Carpal tunnel syndrome  Assessment and Plan: , s/p surgical consult, scheduled for right endoscopic carpal tunnel release on 06/17/2021.     ***S/P Covid vaccine , second dose moderna on  04/07 /2021.    Problem: DM (diabetes mellitus)  Assessment and Plan: Will follow up with labs/ fingerstick. No insulin in AM of surgery. Preop HgA1c ordered Instructed to hold metformin/ pioglitazone ( last dose on 6/16 /21 AM  . STAT FS  on the day of surgery ordered.   On Endo follow uo every 3 months    Problem: Stroke  Assessment and Plan: Instructed to continue meds, last dose Plavix on today 6/9( patient states surgeon advised to be off Plavix a week before surgery)advised to take  asprin 81 mgs( H/O STROKE/PVD with venous insuffiency)  &  take Asprin with sips of water in AM the day of surgery . Advised patient to confirm with cardiologist about it.  last neuro note in chart &  having medical clearence from cardiologist for this surgery.

## 2021-06-09 NOTE — H&P PST ADULT - HEALTH CARE MAINTENANCE
Flu vaccine taken in 2020  Covid vaccine up to date with 2 doses ( moderna, last dose 4/7/2021)  On yearly Annual physicals/ follow up regularly.  Last colonoscopy -up to date/ normal

## 2021-06-10 PROBLEM — I87.2 VENOUS INSUFFICIENCY (CHRONIC) (PERIPHERAL): Chronic | Status: ACTIVE | Noted: 2021-06-09

## 2021-06-10 PROBLEM — E78.5 HYPERLIPIDEMIA, UNSPECIFIED: Chronic | Status: ACTIVE | Noted: 2021-06-09

## 2021-06-10 PROBLEM — I10 ESSENTIAL (PRIMARY) HYPERTENSION: Chronic | Status: ACTIVE | Noted: 2021-06-09

## 2021-06-10 PROBLEM — Z86.69 PERSONAL HISTORY OF OTHER DISEASES OF THE NERVOUS SYSTEM AND SENSE ORGANS: Chronic | Status: ACTIVE | Noted: 2021-06-09

## 2021-06-16 ENCOUNTER — TRANSCRIPTION ENCOUNTER (OUTPATIENT)
Age: 71
End: 2021-06-16

## 2021-06-17 ENCOUNTER — APPOINTMENT (OUTPATIENT)
Dept: ORTHOPEDIC SURGERY | Facility: HOSPITAL | Age: 71
End: 2021-06-17

## 2021-06-17 ENCOUNTER — OUTPATIENT (OUTPATIENT)
Dept: OUTPATIENT SERVICES | Facility: HOSPITAL | Age: 71
LOS: 1 days | End: 2021-06-17
Payer: MEDICARE

## 2021-06-17 VITALS
OXYGEN SATURATION: 100 % | RESPIRATION RATE: 16 BRPM | SYSTOLIC BLOOD PRESSURE: 139 MMHG | TEMPERATURE: 98 F | HEART RATE: 59 BPM | DIASTOLIC BLOOD PRESSURE: 65 MMHG

## 2021-06-17 VITALS
SYSTOLIC BLOOD PRESSURE: 155 MMHG | RESPIRATION RATE: 16 BRPM | OXYGEN SATURATION: 98 % | DIASTOLIC BLOOD PRESSURE: 72 MMHG | HEIGHT: 65 IN | TEMPERATURE: 97 F | WEIGHT: 205.03 LBS | HEART RATE: 71 BPM

## 2021-06-17 DIAGNOSIS — Z01.818 ENCOUNTER FOR OTHER PREPROCEDURAL EXAMINATION: ICD-10-CM

## 2021-06-17 DIAGNOSIS — G56.03 CARPAL TUNNEL SYNDROME, BILATERAL UPPER LIMBS: ICD-10-CM

## 2021-06-17 DIAGNOSIS — Z98.891 HISTORY OF UTERINE SCAR FROM PREVIOUS SURGERY: Chronic | ICD-10-CM

## 2021-06-17 LAB — GLUCOSE BLDC GLUCOMTR-MCNC: 111 MG/DL — HIGH (ref 70–99)

## 2021-06-17 PROCEDURE — 29848 WRIST ENDOSCOPY/SURGERY: CPT | Mod: RT

## 2021-06-17 PROCEDURE — 82962 GLUCOSE BLOOD TEST: CPT

## 2021-06-17 RX ORDER — DULAGLUTIDE 4.5 MG/.5ML
1 INJECTION, SOLUTION SUBCUTANEOUS
Qty: 0 | Refills: 0 | DISCHARGE

## 2021-06-17 RX ORDER — LOSARTAN POTASSIUM 100 MG/1
1 TABLET, FILM COATED ORAL
Qty: 0 | Refills: 0 | DISCHARGE

## 2021-06-17 RX ORDER — POTASSIUM CHLORIDE 20 MEQ
1 PACKET (EA) ORAL
Qty: 0 | Refills: 0 | DISCHARGE

## 2021-06-17 RX ORDER — ONDANSETRON 8 MG/1
4 TABLET, FILM COATED ORAL ONCE
Refills: 0 | Status: DISCONTINUED | OUTPATIENT
Start: 2021-06-17 | End: 2021-07-01

## 2021-06-17 RX ORDER — ROSUVASTATIN CALCIUM 5 MG/1
1 TABLET ORAL
Qty: 0 | Refills: 0 | DISCHARGE

## 2021-06-17 RX ORDER — METFORMIN HYDROCHLORIDE 850 MG/1
1 TABLET ORAL
Qty: 0 | Refills: 0 | DISCHARGE

## 2021-06-17 RX ORDER — CARVEDILOL PHOSPHATE 80 MG/1
1 CAPSULE, EXTENDED RELEASE ORAL
Qty: 0 | Refills: 0 | DISCHARGE

## 2021-06-17 RX ORDER — ESOMEPRAZOLE MAGNESIUM 40 MG/1
1 CAPSULE, DELAYED RELEASE ORAL
Qty: 0 | Refills: 0 | DISCHARGE

## 2021-06-17 RX ORDER — CHLORTHALIDONE 50 MG
1 TABLET ORAL
Qty: 0 | Refills: 0 | DISCHARGE

## 2021-06-17 RX ORDER — PIOGLITAZONE HYDROCHLORIDE 15 MG/1
1 TABLET ORAL
Qty: 0 | Refills: 0 | DISCHARGE

## 2021-06-17 RX ORDER — FENTANYL CITRATE 50 UG/ML
25 INJECTION INTRAVENOUS
Refills: 0 | Status: DISCONTINUED | OUTPATIENT
Start: 2021-06-17 | End: 2021-06-17

## 2021-06-17 RX ORDER — SODIUM CHLORIDE 9 MG/ML
1000 INJECTION, SOLUTION INTRAVENOUS
Refills: 0 | Status: DISCONTINUED | OUTPATIENT
Start: 2021-06-17 | End: 2021-07-01

## 2021-06-17 RX ORDER — INSULIN DEGLUDEC 100 U/ML
38 INJECTION, SOLUTION SUBCUTANEOUS
Qty: 0 | Refills: 0 | DISCHARGE

## 2021-06-17 RX ORDER — INSULIN ASPART 100 [IU]/ML
15 INJECTION, SOLUTION SUBCUTANEOUS
Qty: 0 | Refills: 0 | DISCHARGE

## 2021-06-17 RX ORDER — BUMETANIDE 0.25 MG/ML
1 INJECTION INTRAMUSCULAR; INTRAVENOUS
Qty: 0 | Refills: 0 | DISCHARGE

## 2021-06-17 RX ORDER — CHLORHEXIDINE GLUCONATE 213 G/1000ML
1 SOLUTION TOPICAL ONCE
Refills: 0 | Status: COMPLETED | OUTPATIENT
Start: 2021-06-17 | End: 2021-06-17

## 2021-06-17 RX ORDER — EZETIMIBE 10 MG/1
1 TABLET ORAL
Qty: 0 | Refills: 0 | DISCHARGE

## 2021-06-17 RX ORDER — CLOPIDOGREL BISULFATE 75 MG/1
1 TABLET, FILM COATED ORAL
Qty: 0 | Refills: 0 | DISCHARGE

## 2021-06-17 RX ADMIN — CHLORHEXIDINE GLUCONATE 1 APPLICATION(S): 213 SOLUTION TOPICAL at 10:29

## 2021-06-17 NOTE — ASU DISCHARGE PLAN (ADULT/PEDIATRIC) - NURSING INSTRUCTIONS
See printed instructions See printed instructions    Tylenol given at 11:15 AM, next dose may be taken at 5:15 PM

## 2021-06-17 NOTE — ASU DISCHARGE PLAN (ADULT/PEDIATRIC) - CARE PROVIDER_API CALL
Caity Maya)  [address], [facility] [address]  [place] [address]  Phone: [phone]  Fax: [phone]  Follow Up Time:

## 2021-06-17 NOTE — ASU PATIENT PROFILE, ADULT - PMH
Bilateral ankle joint pain  denies gout  DM (diabetes mellitus)  2  H/O neuropathy    HLD (hyperlipidemia)    HTN (hypertension)    HTN (hypertension)    Stroke  loc > 20 years ago  by syncopal episode for 2 hours  admitted in [facility], for 5 days, discharged & sent to PMD  & started on PLAVIX  Venous (peripheral) insufficiency    Venous insufficiency

## 2021-06-23 PROBLEM — E11.9 TYPE 2 DIABETES MELLITUS WITHOUT COMPLICATIONS: Chronic | Status: ACTIVE | Noted: 2021-06-09

## 2021-06-23 PROBLEM — M25.571 PAIN IN RIGHT ANKLE AND JOINTS OF RIGHT FOOT: Chronic | Status: ACTIVE | Noted: 2021-06-09

## 2021-06-23 PROBLEM — I63.9 CEREBRAL INFARCTION, UNSPECIFIED: Chronic | Status: ACTIVE | Noted: 2021-06-09

## 2021-06-24 ENCOUNTER — APPOINTMENT (OUTPATIENT)
Dept: ORTHOPEDIC SURGERY | Facility: CLINIC | Age: 71
End: 2021-06-24
Payer: MEDICARE

## 2021-06-24 PROCEDURE — 99024 POSTOP FOLLOW-UP VISIT: CPT

## 2021-07-01 ENCOUNTER — APPOINTMENT (OUTPATIENT)
Dept: NEUROLOGY | Facility: CLINIC | Age: 71
End: 2021-07-01
Payer: MEDICARE

## 2021-07-01 VITALS
BODY MASS INDEX: 33.66 KG/M2 | DIASTOLIC BLOOD PRESSURE: 69 MMHG | HEIGHT: 65 IN | SYSTOLIC BLOOD PRESSURE: 153 MMHG | TEMPERATURE: 97.3 F | HEART RATE: 68 BPM | OXYGEN SATURATION: 99 % | WEIGHT: 202 LBS

## 2021-07-01 PROCEDURE — 99072 ADDL SUPL MATRL&STAF TM PHE: CPT

## 2021-07-01 PROCEDURE — 99213 OFFICE O/P EST LOW 20 MIN: CPT

## 2021-07-01 NOTE — HISTORY OF PRESENT ILLNESS
[FreeTextEntry1] : The patient has DM and here to establish care. The patient had an event of LOC about 20+years ago, she was taken to [facility] and was told that she had a stroke. She was started on Plavix, as she had the stroke when she was taking aspirin 81mg but I do not more records are not available, she is also on Pravastatin 5mg for stroke prevention. She has not had any further stroke symptoms.\par \par She is right handed and has had numbness and tingling in both hands for years. She also has cramping in the hands as well as right forearm pain. She has received injections in her hands without improvement. She notes that her hands are worsening. She helped so a hand specialist and has had carpal tunnel release with good improvement of stiffness.  She does the hand exercises at home.\par \par The patient also feels dizziness without any focal neurological signs. Started few weeks ago, noted on walking. Drinks about 4 cups of water daily. Has been cards for the dizziness and has not been noted to have any cardiac reasons for the dizziness. \par

## 2021-07-01 NOTE — ASSESSMENT
[FreeTextEntry1] : chronic left cerebellar cva, Lacunar stroke\par LDL at goal, HbA1C elevated\par will cw Plavix and Pravastatin 5mg\par If the patient brings records from her hospitalization and evaluation for her prior stroke, I will be able to advise better on whether aspirin instead of Plavix can be used for secondary stroke prevention.  However if the patient is unable to tolerate Plavix from GI perspective she can be switched to aspirin 81 mg for secondary stroke prevention.\par \par Bilateral numbness and tingling in the hands, found to have bl APB weakness, also c/o right arm pain with [email or web address] showing moderate CTS with demyelinating features bilaterally, now doing well s/p right side median nerve release.\par \par Dizziness, likely due to orthostasis, encouraged increased water intake. MRI brain and CD don't explain the symptoms.\par \par I spent the time noted on the day of this patient encounter preparing for, providing and documenting the above E/M service and counseling and educate patient on differential, workup, disease course, and treatment/management. Education was provided to the patient during this encounter. All questions and concerns were answered and addressed in detail. The patient verbalized understanding and agreed to plan. Patient was advised to continue to monitor for neurologic symptoms and to notify my office or go to the nearest emergency room if there are any changes. Any orders/referrals and communications were provided as well. \par Side effects of the above medications were discussed in detail including but not limited to applicable black box warning and teratogenicity as appropriate. \par Patient was advised to bring previous records to my office, including CD of imaging, when applicable. \par \par

## 2021-07-02 ENCOUNTER — APPOINTMENT (OUTPATIENT)
Dept: ORTHOPEDIC SURGERY | Facility: CLINIC | Age: 71
End: 2021-07-02
Payer: MEDICARE

## 2021-07-02 PROCEDURE — 99024 POSTOP FOLLOW-UP VISIT: CPT

## 2021-11-08 ENCOUNTER — APPOINTMENT (OUTPATIENT)
Dept: NEUROLOGY | Facility: CLINIC | Age: 71
End: 2021-11-08
Payer: MEDICARE

## 2021-11-08 VITALS
DIASTOLIC BLOOD PRESSURE: 65 MMHG | HEIGHT: 65 IN | SYSTOLIC BLOOD PRESSURE: 131 MMHG | OXYGEN SATURATION: 98 % | BODY MASS INDEX: 34.82 KG/M2 | HEART RATE: 76 BPM | WEIGHT: 209 LBS | TEMPERATURE: 98.1 F

## 2021-11-08 DIAGNOSIS — R42 DIZZINESS AND GIDDINESS: ICD-10-CM

## 2021-11-08 DIAGNOSIS — G56.03 CARPAL TUNNEL SYNDROM,BILATERAL UPPER LIMBS: ICD-10-CM

## 2021-11-08 DIAGNOSIS — I63.81 OTHER CEREBRAL INFARCTION DUE TO OCCLUSION OR STENOSIS OF SMALL ARTERY: ICD-10-CM

## 2021-11-08 PROCEDURE — 99214 OFFICE O/P EST MOD 30 MIN: CPT

## 2021-11-08 RX ORDER — MECLIZINE HYDROCHLORIDE 12.5 MG/1
12.5 TABLET ORAL
Qty: 90 | Refills: 2 | Status: ACTIVE | COMMUNITY
Start: 2021-11-08 | End: 1900-01-01

## 2021-11-08 NOTE — HISTORY OF PRESENT ILLNESS
[FreeTextEntry1] : The patient has DM and here for follow-up care for stroke.  The patient had an event of LOC about 20+years ago, she was taken to [facility] and was told that she had a stroke. She was started on Plavix, as she had the stroke when she was taking aspirin 81mg but I do not more records are not available.  After last visit, the patient switched from Plavix to aspirin and continued pravastatin 5mg for secondary stroke prevention. She has not had any further stroke symptoms.\par \par She is right handed and has had numbness and tingling in both hands for years. She also has cramping in the hands as well as right forearm pain. She has received injections in her hands without improvement. She notes that her hands are worsening. She helped so a hand specialist and has had carpal tunnel release with good improvement of stiffness. She does the hand exercises at home.\par \par The patient is complaining of vertigo sensation, which comes and goes at times.  It is bothersome..

## 2021-11-08 NOTE — ASSESSMENT
[FreeTextEntry1] : chronic left cerebellar cva, Lacunar stroke\par LDL at goal, HbA1C elevated\par will cw aspirin 81 mg and Pravastatin 5mg for secondary stroke prevention\par Blood pressure goal is normal\par Patient advised on the importance of lifestyle modification such as weight control, blood sugar control control of hypertension as well as exercise and prevention of further strokes.  We will refer the patient to nutritionist.\par \par Bilateral numbness and tingling in the hands, found to have bl APB weakness, also c/o right arm pain with ncs.emg showing moderate CTS with demyelinating features bilaterally, now doing well s/p right side median nerve release.  The patient does not have any symptoms of carpal tunnel syndrome, however she notices joint pains in her MCP and PIP joints in digits 3 bilaterally in the hands, she declines referral to rheumatology.\par \par Dizziness, now described as vertigo, likely due to peripheral vertigo.  Will start meclizine 12.5 mg 1 to 2 tablets 3 times a day will refer the patient to OT for further evaluation.\par \par I spent the time noted on the day of this patient encounter preparing for, providing and documenting the above E/M service and counseling and educate patient on differential, workup, disease course, and treatment/management. Education was provided to the patient during this encounter. All questions and concerns were answered and addressed in detail. The patient verbalized understanding and agreed to plan. Patient was advised to continue to monitor for neurologic symptoms and to notify my office or go to the nearest emergency room if there are any changes. Any orders/referrals and communications were provided as well. \par Side effects of the above medications were discussed in detail including but not limited to applicable black box warning and teratogenicity as appropriate. \par Patient was advised to bring previous records to my office, including CD of imaging, when applicable. \par

## 2021-11-08 NOTE — PHYSICAL EXAM
[General Appearance - Alert] : alert [General Appearance - In No Acute Distress] : in no acute distress [Person] : oriented to person [Place] : oriented to place [Time] : oriented to time [Concentration Intact] : normal concentrating ability [Naming Objects] : no difficulty naming common objects [Fluency] : fluency intact [Comprehension] : comprehension intact [Vocabulary] : adequate range of vocabulary [Cranial Nerves Optic (II)] : visual acuity intact bilaterally,  visual fields full to confrontation, pupils equal round and reactive to light [Cranial Nerves Oculomotor (III)] : extraocular motion intact [Cranial Nerves Trigeminal (V)] : facial sensation intact symmetrically [Cranial Nerves Facial (VII)] : face symmetrical [Motor Tone] : muscle tone was normal in all four extremities [Motor Strength] : muscle strength was normal in all four extremities [Involuntary Movements] : no involuntary movements were seen [Sensation Tactile Decrease] : light touch was intact [Abnormal Walk] : normal gait [Balance] : balance was intact [Coordination - Dysmetria Impaired Finger-to-Nose Bilateral] : not present [Coordination - Dysmetria Impaired Heel-to-Shin Bilateral] : not present

## 2021-11-11 ENCOUNTER — APPOINTMENT (OUTPATIENT)
Dept: UROLOGY | Facility: CLINIC | Age: 71
End: 2021-11-11
Payer: MEDICARE

## 2021-11-11 PROCEDURE — 99214 OFFICE O/P EST MOD 30 MIN: CPT

## 2021-11-11 NOTE — ASSESSMENT
[FreeTextEntry1] : ct iamges reviewed\par check ua cx \par will f/u for cystoscopy \par risks and benefits reviewed

## 2021-11-11 NOTE — HISTORY OF PRESENT ILLNESS
[FreeTextEntry1] : cc abnormal ct \par pt referred for abnormal ct \par ct showed thickedned bladder wall\par \par ct \par Urinary bladder wall is thickened mostly on the right side measuring at least 8 mm. Series 2. Image 143. There is mild prominence of the right collecting system. There is no calcified right ureter stone. Mild obstruction at the level of the right ureter vesicular junction/urinary bladder wall thickening cannot be excluded. Series 2. Image 143. Consider urology consultation to further evaluate.\par \par no voiding complaints\par

## 2021-11-12 LAB
APPEARANCE: CLEAR
BACTERIA: NEGATIVE
BILIRUBIN URINE: NEGATIVE
BLOOD URINE: NEGATIVE
COLOR: NORMAL
GLUCOSE QUALITATIVE U: NEGATIVE
HYALINE CASTS: 0 /LPF
KETONES URINE: NEGATIVE
LEUKOCYTE ESTERASE URINE: NEGATIVE
MICROSCOPIC-UA: NORMAL
NITRITE URINE: NEGATIVE
PH URINE: 7.5
PROTEIN URINE: NEGATIVE
RED BLOOD CELLS URINE: 2 /HPF
SPECIFIC GRAVITY URINE: 1.02
SQUAMOUS EPITHELIAL CELLS: 1 /HPF
URINE CYTOLOGY: NORMAL
UROBILINOGEN URINE: NORMAL
WHITE BLOOD CELLS URINE: 1 /HPF

## 2021-11-15 LAB — BACTERIA UR CULT: NORMAL

## 2021-11-19 ENCOUNTER — APPOINTMENT (OUTPATIENT)
Dept: UROLOGY | Facility: CLINIC | Age: 71
End: 2021-11-19
Payer: MEDICARE

## 2021-11-19 DIAGNOSIS — N32.89 OTHER SPECIFIED DISORDERS OF BLADDER: ICD-10-CM

## 2021-11-19 DIAGNOSIS — N13.30 UNSPECIFIED HYDRONEPHROSIS: ICD-10-CM

## 2021-11-19 PROCEDURE — 52000 CYSTOURETHROSCOPY: CPT

## 2022-03-05 ENCOUNTER — APPOINTMENT (OUTPATIENT)
Dept: PULMONOLOGY | Facility: CLINIC | Age: 72
End: 2022-03-05
Payer: MEDICARE

## 2022-03-05 VITALS
HEART RATE: 70 BPM | TEMPERATURE: 98.2 F | HEIGHT: 65 IN | OXYGEN SATURATION: 99 % | RESPIRATION RATE: 16 BRPM | SYSTOLIC BLOOD PRESSURE: 149 MMHG | BODY MASS INDEX: 34.66 KG/M2 | DIASTOLIC BLOOD PRESSURE: 68 MMHG | WEIGHT: 208 LBS

## 2022-03-05 LAB — SARS-COV-2 N GENE NPH QL NAA+PROBE: NOT DETECTED

## 2022-03-05 PROCEDURE — 99203 OFFICE O/P NEW LOW 30 MIN: CPT

## 2022-03-05 NOTE — PHYSICAL EXAM
[No Acute Distress] : no acute distress [Well Nourished] : well nourished [Well Developed] : well developed [Normal Oropharynx] : normal oropharynx [I] : Mallampati Class: I [Normal Appearance] : normal appearance [Supple] : supple [No Neck Mass] : no neck mass [Normal Rate/Rhythm] : normal rate/rhythm [Normal S1, S2] : normal s1, s2 [No Murmurs] : no murmurs [No Resp Distress] : no resp distress [Clear to Auscultation Bilaterally] : clear to auscultation bilaterally [Benign] : benign [Not Tender] : not tender [No Masses] : no masses [Soft] : soft [No Clubbing] : no clubbing [2+ Pitting] : 2+ pitting [No Focal Deficits] : no focal deficits [Oriented x3] : oriented x3 [TextBox_68] : diminished aeration  [TextBox_89] : old scar

## 2022-03-05 NOTE — HISTORY OF PRESENT ILLNESS
[TextBox_4] : 71 year old patient presents for evaluation of recent onset of wheezing.  She notes dyspnea on exertion and wheeze with walking.  \par \par She reports she has  a lot of mucus from her throat. \par \par She has DM, HTN.  She also reports LE edema. \par \par No chest pain. \par \par She has been evaluated by her cardiologist Dr. Hernandez. \par \par CXR 1/25/22 was reported as normal. \par \par The problem started 1-2 months ago.\par \par \par \par Primary doctor is Dr Hill\par \par \par PSH:\par \par C section\par \par bowel obstruction\par carpal tunnel\par \par PMH:\par \par HTN\par \par DM on insulin, Trulicity, metformin, pioglitazone, \par HLD on rosuvastatin\par \par GERD\par \par \par \par \par SH:\par \par never smoker\par \par ETOH:  none\par \par \par Occupation: retired, worked as nurses assistant\par \par No exposure to chemicals, dust, asbestos, mold\par \par \par \par ALLERGY:\par \par NKDA\par \par environmental/seasonal allergy: none\par \par \par \par Review of Systems:\par \par \par no sinusitis, sinus infections, nasal obstruction\par no dysphagia\par no dry mouth\par \par mild arthritis\par no joint aches\par no joint swelling\par \par \par no pneumonia\par no wheeze\par no lung cancer\par \par no CAD\par no MI\par no chest pain\par no murmur\par no CHF\par no HTN\par no edema\par \par no peptic ulcer or gastritis\par \par no liver disease\par \par \par no thyroid disease\par \par no bleeding\par \par no DVT or PE\par \par no kidney disease\par \par no stroke\par no seizure\par \par \par \par \par \par \par \par \par \par \par \par   [Hypersomnolence] : denies hypersomnolence [Snoring] : no snoring [Witnessed Apneas] : no witnessed apneas

## 2022-03-05 NOTE — DISCUSSION/SUMMARY
[FreeTextEntry1] : 71 yr old woman presents with dyspnea on exertion.  On lung exam her lungs are free of wheeze\par \par She has LE edema.\par \par She had CXR recently that was normal\par \par She has DM, HTN and hyperlipidemia\par \par PLAN\par \par evaluate for obstructive airways disease.  \par I will obtain PFT after patient has negative COVID testing \par \par Review echocardiogram, cardiology workup\par \par counselled on weight loss and diet\par \par Further recommendations based on results. \par \par Cristian Choe MD

## 2022-03-11 ENCOUNTER — APPOINTMENT (OUTPATIENT)
Dept: PULMONOLOGY | Facility: CLINIC | Age: 72
End: 2022-03-11
Payer: MEDICARE

## 2022-03-11 VITALS
TEMPERATURE: 97 F | DIASTOLIC BLOOD PRESSURE: 72 MMHG | HEART RATE: 78 BPM | BODY MASS INDEX: 35.16 KG/M2 | HEIGHT: 65 IN | WEIGHT: 211 LBS | RESPIRATION RATE: 16 BRPM | SYSTOLIC BLOOD PRESSURE: 143 MMHG | OXYGEN SATURATION: 98 %

## 2022-03-11 PROCEDURE — 94727 GAS DIL/WSHOT DETER LNG VOL: CPT

## 2022-03-11 PROCEDURE — 94729 DIFFUSING CAPACITY: CPT

## 2022-03-11 PROCEDURE — 94060 EVALUATION OF WHEEZING: CPT

## 2022-03-25 ENCOUNTER — APPOINTMENT (OUTPATIENT)
Dept: VASCULAR SURGERY | Facility: CLINIC | Age: 72
End: 2022-03-25
Payer: MEDICARE

## 2022-03-25 VITALS
OXYGEN SATURATION: 99 % | BODY MASS INDEX: 35.16 KG/M2 | WEIGHT: 211 LBS | HEART RATE: 74 BPM | SYSTOLIC BLOOD PRESSURE: 126 MMHG | HEIGHT: 65 IN | DIASTOLIC BLOOD PRESSURE: 70 MMHG

## 2022-03-25 DIAGNOSIS — E78.00 PURE HYPERCHOLESTEROLEMIA, UNSPECIFIED: ICD-10-CM

## 2022-03-25 DIAGNOSIS — E11.9 TYPE 2 DIABETES MELLITUS W/OUT COMPLICATIONS: ICD-10-CM

## 2022-03-25 DIAGNOSIS — Z63.5 DISRUPTION OF FAMILY BY SEPARATION AND DIVORCE: ICD-10-CM

## 2022-03-25 DIAGNOSIS — I10 ESSENTIAL (PRIMARY) HYPERTENSION: ICD-10-CM

## 2022-03-25 PROCEDURE — 93970 EXTREMITY STUDY: CPT

## 2022-03-25 PROCEDURE — 99214 OFFICE O/P EST MOD 30 MIN: CPT

## 2022-03-25 RX ORDER — CARVEDILOL 25 MG/1
25 TABLET, FILM COATED ORAL
Refills: 0 | Status: ACTIVE | COMMUNITY

## 2022-03-25 RX ORDER — LOSARTAN POTASSIUM 100 MG/1
100 TABLET, FILM COATED ORAL
Refills: 0 | Status: ACTIVE | COMMUNITY

## 2022-03-25 RX ORDER — CHOLECALCIFEROL (VITAMIN D3) 25 MCG
TABLET ORAL
Refills: 0 | Status: ACTIVE | COMMUNITY

## 2022-03-25 RX ORDER — ASPIRIN 81 MG
81 TABLET, DELAYED RELEASE (ENTERIC COATED) ORAL
Refills: 0 | Status: ACTIVE | COMMUNITY

## 2022-03-25 RX ORDER — ESOMEPRAZOLE MAGNESIUM 40 MG/1
40 FOR SUSPENSION ORAL
Refills: 0 | Status: ACTIVE | COMMUNITY

## 2022-03-25 RX ORDER — NIFEDIPINE 90 MG
90 TABLET, EXTENDED RELEASE ORAL
Refills: 0 | Status: ACTIVE | COMMUNITY

## 2022-03-25 RX ORDER — INSULIN ASPART 100 [IU]/ML
INJECTION, SOLUTION INTRAVENOUS; SUBCUTANEOUS
Refills: 0 | Status: ACTIVE | COMMUNITY

## 2022-03-25 RX ORDER — BUMETANIDE 1 MG/1
1 TABLET ORAL
Refills: 0 | Status: ACTIVE | COMMUNITY

## 2022-03-25 RX ORDER — INSULIN DEGLUDEC INJECTION 100 U/ML
INJECTION, SOLUTION SUBCUTANEOUS
Refills: 0 | Status: ACTIVE | COMMUNITY

## 2022-03-25 RX ORDER — ROSUVASTATIN CALCIUM 5 MG/1
5 TABLET, FILM COATED ORAL
Refills: 0 | Status: ACTIVE | COMMUNITY

## 2022-03-25 RX ORDER — METFORMIN HYDROCHLORIDE 1000 MG/1
1000 TABLET, COATED ORAL
Refills: 0 | Status: ACTIVE | COMMUNITY

## 2022-03-25 RX ORDER — EZETIMIBE 10 MG/1
10 TABLET ORAL
Refills: 0 | Status: ACTIVE | COMMUNITY

## 2022-03-25 RX ORDER — DULAGLUTIDE 4.5 MG/.5ML
INJECTION, SOLUTION SUBCUTANEOUS
Refills: 0 | Status: ACTIVE | COMMUNITY

## 2022-03-25 RX ORDER — ASCORBIC ACID 500 MG
TABLET ORAL
Refills: 0 | Status: ACTIVE | COMMUNITY

## 2022-03-25 SDOH — SOCIAL STABILITY - SOCIAL INSECURITY: DISRUPTION OF FAMILY BY SEPARATION AND DIVORCE: Z63.5

## 2022-03-25 NOTE — PHYSICAL EXAM
[JVD] : no jugular venous distention  [Normal Breath Sounds] : Normal breath sounds [Normal Heart Sounds] : normal heart sounds [2+] : left 2+ [Ankle Swelling (On Exam)] : present [Ankle Swelling Bilaterally] : bilaterally  [] : bilaterally [Ankle Swelling On The Right] : mild [Abdomen Masses] : No abdominal masses

## 2022-03-25 NOTE — HISTORY OF PRESENT ILLNESS
[FreeTextEntry1] : Patient is a 71-year-old female with past medical history significant for diabetes and hypertension presenting to us for complaints of bilateral lower extremity swelling and discoloration.  Patient has a history of venous insufficiency and has undergone saphenous vein ablation on the right side without significant improvement in the symptoms.  Patient does not wear compression stockings on a regular basis.\par \par No history of coronary artery disease or smoking.  No history of claudication or rest pain.  No history of DVTs.

## 2022-03-25 NOTE — ASSESSMENT
[FreeTextEntry1] : Patient with no evidence of significant arterial insufficiency.\par \par Patient with no evidence of DVT.\par \par Patient with venous insufficiency which is mild to moderate at best.  Recommend compression stockings and elevation.\par \par Patient is very concerned and upset about the swelling of the discoloration of the feet.  I spent 20 minutes reassuring her regarding lack of any serious circulatory pathology.

## 2022-03-25 NOTE — CONSULT LETTER
[Dear  ___] : Dear  [unfilled], [Consult Letter:] : I had the pleasure of evaluating your patient, [unfilled]. [Please see my note below.] : Please see my note below. [Consult Closing:] : Thank you very much for allowing me to participate in the care of this patient.  If you have any questions, please do not hesitate to contact me. [Sincerely,] : Sincerely, [FreeTextEntry3] : Maximiliano Benton M.D., F.GUALBERTOS., R.P.ANJALI.I.\par  of Vascular Surgery\par Assistant Professor of Radiology\par Director of Endovascular Program/ [facility]\par [facility]

## 2022-04-22 ENCOUNTER — APPOINTMENT (OUTPATIENT)
Dept: PULMONOLOGY | Facility: CLINIC | Age: 72
End: 2022-04-22
Payer: MEDICARE

## 2022-04-22 VITALS
TEMPERATURE: 98 F | HEIGHT: 65 IN | DIASTOLIC BLOOD PRESSURE: 75 MMHG | WEIGHT: 211 LBS | OXYGEN SATURATION: 97 % | RESPIRATION RATE: 16 BRPM | HEART RATE: 67 BPM | SYSTOLIC BLOOD PRESSURE: 154 MMHG | BODY MASS INDEX: 35.16 KG/M2

## 2022-04-22 PROCEDURE — 99214 OFFICE O/P EST MOD 30 MIN: CPT

## 2022-04-24 NOTE — HISTORY OF PRESENT ILLNESS
[TextBox_4] : 71 year old patient presents for evaluation of recent onset of wheezing.  She notes dyspnea on exertion and wheeze with walking.  \par \par She reports she has  a lot of mucus from her throat. \par \par She has DM, HTN.  She also reports LE edema. \par \par No chest pain. \par \par She has been evaluated by her cardiologist Dr. Hernandez. \par \par CXR 1/25/22 was reported as normal. \par \par \par She is started on Advair 250, with some relief\par \par She still has heavy breathing and dyspnea on exertion\par \par \par \par \par \par Primary doctor is Dr Hill\par \par \par PSH:\par \par C section\par \par bowel obstruction\par carpal tunnel\par \par PMH:\par \par HTN\par \par DM on insulin, Trulicity, metformin, pioglitazone, \par HLD on rosuvastatin\par \par GERD\par \par \par \par \par SH:\par \par never smoker\par \par ETOH:  none\par \par \par Occupation: retired, worked as nurses assistant\par \par No exposure to chemicals, dust, asbestos, mold\par \par \par \par ALLERGY:\par \par NKDA\par \par environmental/seasonal allergy: none\par \par \par \par Review of Systems:\par \par \par no sinusitis, sinus infections, nasal obstruction\par no dysphagia\par no dry mouth\par \par mild arthritis\par no joint aches\par no joint swelling\par \par \par no pneumonia\par no wheeze\par no lung cancer\par \par no CAD\par no MI\par no chest pain\par no murmur\par no CHF\par no HTN\par no edema\par \par no peptic ulcer or gastritis\par \par no liver disease\par \par \par no thyroid disease\par \par no bleeding\par \par no DVT or PE\par \par no kidney disease\par \par no stroke\par no seizure\par \par \par \par \par \par \par \par \par \par \par \par   [Hypersomnolence] : denies hypersomnolence [Snoring] : no snoring [Witnessed Apneas] : no witnessed apneas

## 2022-04-24 NOTE — DISCUSSION/SUMMARY
[FreeTextEntry1] : PFT  demonstrates restriction with diminished total lung capacity.\par FEV1 and FVC are diminished with normal ratio.\par \par There is positive bronchodilator response that suggests obstructive component\par \par Pt has elevate BMI\par \par She has some cough and mucus production\par \par CXR was negative fir any pathology\par \par \par She has been using Advair with slight relief\par I suspect deconditioning is a factor in dyspnea\par \par \par I will obtain NC CT chest to rule out ILD or other possible cause for dyspnea\par \par Total time spent : 30 minutes\par Including:\par Preparation prior to visit - Reviewing prior record, results of tests and Consultation Reports as applicable\par Conducting an appropriate H & P during today's encounter\par Appropriate orders for tests, medications and procedures, as applicable\par Counseling patient \par Note completion \par \par Cristian Choe MD\par \par

## 2022-04-28 ENCOUNTER — APPOINTMENT (OUTPATIENT)
Dept: CT IMAGING | Facility: IMAGING CENTER | Age: 72
End: 2022-04-28
Payer: MEDICARE

## 2022-04-28 ENCOUNTER — OUTPATIENT (OUTPATIENT)
Dept: OUTPATIENT SERVICES | Facility: HOSPITAL | Age: 72
LOS: 1 days | End: 2022-04-28
Payer: MEDICARE

## 2022-04-28 DIAGNOSIS — Z00.8 ENCOUNTER FOR OTHER GENERAL EXAMINATION: ICD-10-CM

## 2022-04-28 DIAGNOSIS — J98.4 OTHER DISORDERS OF LUNG: ICD-10-CM

## 2022-04-28 DIAGNOSIS — Z98.891 HISTORY OF UTERINE SCAR FROM PREVIOUS SURGERY: Chronic | ICD-10-CM

## 2022-04-28 PROCEDURE — 71250 CT THORAX DX C-: CPT | Mod: 26

## 2022-04-28 PROCEDURE — 71250 CT THORAX DX C-: CPT

## 2022-06-17 NOTE — ASU DISCHARGE PLAN (ADULT/PEDIATRIC) - PROVIDER TOKENS
[address] [facility]  [address] [address] [id number]  Phone: [phone]  Fax: LYNDA Valero        June 17, 2022     Patient: Kendra Holly   YOB: 1987   Date of Visit: 6/17/2022       To Whom it May Concern:    Edith Citizen was seen in my clinic on 6/17/2022. She may return to work on 06/20/2022. If you have any questions or concerns, please don't hesitate to call.     Sincerely,         LYNDA Her
PROVIDER:[TOKEN:[[id number]]]

## 2022-07-15 ENCOUNTER — APPOINTMENT (OUTPATIENT)
Dept: PULMONOLOGY | Facility: CLINIC | Age: 72
End: 2022-07-15

## 2022-07-18 NOTE — REVIEW OF SYSTEMS
[Wake up at night to urinate  How many times?  ___] : wakes up to urinate [unfilled] times during the night [Strong urge to urinate] : strong urge to urinate [Leakage of urine with urgency] : leakage of urine with urgency [Negative] : Heme/Lymph [see HPI] : see HPI [History of kidney stones] : denies history of kidney stones [FreeTextEntry6] : Frequent urination at night  intact

## 2022-08-19 ENCOUNTER — APPOINTMENT (OUTPATIENT)
Dept: PULMONOLOGY | Facility: CLINIC | Age: 72
End: 2022-08-19

## 2022-08-19 VITALS
TEMPERATURE: 98.4 F | BODY MASS INDEX: 34.82 KG/M2 | DIASTOLIC BLOOD PRESSURE: 66 MMHG | HEIGHT: 65 IN | RESPIRATION RATE: 16 BRPM | HEART RATE: 66 BPM | WEIGHT: 209 LBS | OXYGEN SATURATION: 97 % | SYSTOLIC BLOOD PRESSURE: 117 MMHG

## 2022-08-19 PROCEDURE — 99214 OFFICE O/P EST MOD 30 MIN: CPT

## 2022-08-19 RX ORDER — ALBUTEROL SULFATE 90 UG/1
108 (90 BASE) INHALANT RESPIRATORY (INHALATION)
Qty: 1 | Refills: 3 | Status: ACTIVE | COMMUNITY
Start: 2022-08-19 | End: 1900-01-01

## 2022-08-21 NOTE — HISTORY OF PRESENT ILLNESS
[TextBox_4] : 71 year old patient presents for evaluation of recent onset of wheezing.  She notes dyspnea on exertion and wheeze with walking.  \par \par She reports she has  a lot of mucus from her throat. \par \par She has DM, HTN.  She also reports LE edema. \par \par No chest pain. \par \par She has been evaluated by her cardiologist Dr. Hernandez. \par \par CXR 1/25/22 was reported as normal. \par \par \par She was started on Advair 250, with some relief\par \par She used ICS LABA but ran out 3 months ago.\par \par She is still having mucus production and dyspnea on exertion   She notes some wheeze.\par \par She denies allergies.\par \par She still has heavy breathing and dyspnea on exertion\par \par She has pedal edema\par \par CT chest suggests possible pulmonary HTN.\par \par \par \par Primary doctor is Dr Hill\par \par Cardiology Lupe Hernandez  FAX: [phone]\par PSH:\par \par C section\par \par bowel obstruction\par carpal tunnel\par \par PMH:\par \par HTN\par \par DM on insulin, Trulicity, metformin, pioglitazone, \par HLD on rosuvastatin\par \par GERD\par \par \par \par \par SH:\par \par never smoker\par \par ETOH:  none\par \par \par Occupation: retired, worked as nurses assistant\par \par No exposure to chemicals, dust, asbestos, mold\par \par \par \par ALLERGY:\par \par NKDA\par \par environmental/seasonal allergy: none\par \par \par \par Review of Systems:\par \par \par no sinusitis, sinus infections, nasal obstruction\par no dysphagia\par no dry mouth\par \par mild arthritis\par no joint aches\par no joint swelling\par \par \par no pneumonia\par no wheeze\par no lung cancer\par \par no CAD\par no MI\par no chest pain\par no murmur\par no CHF\par no HTN\par no edema\par \par no peptic ulcer or gastritis\par \par no liver disease\par \par \par no thyroid disease\par \par no bleeding\par \par no DVT or PE\par \par no kidney disease\par \par no stroke\par no seizure\par \par \par \par \par \par \par \par \par \par \par \par   [Hypersomnolence] : denies hypersomnolence [Snoring] : no snoring [Witnessed Apneas] : no witnessed apneas

## 2022-08-21 NOTE — DISCUSSION/SUMMARY
[FreeTextEntry1] : 71 year old woman with dyspnea.  PFT  demonstrates restriction with  positive bronchodilator response that suggests obstructive component\par \par Pt has elevated BMI\par \par Lower extremity edema.\par \par CT demonstrated enlarged Pulmonary artery,  suggest pulmonary HTN.\par \par She has some cough and mucus production\par \par She had been using ICS LABA with some relief\par \par She feels she is worse since stopping ICS LABA\par \par She is concerned about hair loss.  She is told of "inflammation"\par \par \par PLAN\par \par restart ICS LABA and albuterol as needed \par \par continue cardiac regimen\par \par review echocardiogram\par \par follow with cardiology\par \par Total time spent : 30 minutes\par Including:\par Preparation prior to visit - Reviewing prior record, results of tests and Consultation Reports as applicable\par Conducting an appropriate H & P during today's encounter\par Appropriate orders for tests, medications and procedures, as applicable\par Counseling patient \par Note completion \par \par Cristian Choe MD\par \par

## 2022-08-21 NOTE — REASON FOR VISIT
[Follow-Up] : a follow-up visit [Cough] : cough [Shortness of Breath] : shortness of breath [TextBox_44] : mucus production

## 2022-10-14 ENCOUNTER — APPOINTMENT (OUTPATIENT)
Dept: PULMONOLOGY | Facility: CLINIC | Age: 72
End: 2022-10-14

## 2022-10-28 ENCOUNTER — APPOINTMENT (OUTPATIENT)
Dept: RHEUMATOLOGY | Facility: CLINIC | Age: 72
End: 2022-10-28

## 2022-10-28 VITALS
HEART RATE: 71 BPM | SYSTOLIC BLOOD PRESSURE: 135 MMHG | DIASTOLIC BLOOD PRESSURE: 69 MMHG | WEIGHT: 209 LBS | BODY MASS INDEX: 34.82 KG/M2 | HEIGHT: 65 IN | TEMPERATURE: 97.6 F | OXYGEN SATURATION: 98 % | RESPIRATION RATE: 16 BRPM

## 2022-10-28 DIAGNOSIS — M65.9 SYNOVITIS AND TENOSYNOVITIS, UNSPECIFIED: ICD-10-CM

## 2022-10-28 DIAGNOSIS — E11.618 TYPE 2 DIABETES MELLITUS WITH OTHER DIABETIC ARTHROPATHY: ICD-10-CM

## 2022-10-28 PROCEDURE — 20550 NJX 1 TENDON SHEATH/LIGAMENT: CPT | Mod: RT

## 2022-10-28 PROCEDURE — 99203 OFFICE O/P NEW LOW 30 MIN: CPT | Mod: 25

## 2022-10-28 RX ORDER — INSULIN DEGLUDEC INJECTION 100 U/ML
INJECTION, SOLUTION SUBCUTANEOUS
Refills: 0 | Status: DISCONTINUED | COMMUNITY
End: 2022-10-28

## 2022-10-28 RX ORDER — INSULIN LISPRO 100 [IU]/ML
INJECTION, SOLUTION INTRAVENOUS; SUBCUTANEOUS
Refills: 0 | Status: DISCONTINUED | COMMUNITY
End: 2022-10-28

## 2022-10-28 RX ORDER — OXYCODONE 5 MG/1
5 TABLET ORAL
Qty: 5 | Refills: 0 | Status: DISCONTINUED | COMMUNITY
Start: 2021-06-16 | End: 2022-10-28

## 2022-10-28 RX ORDER — FAMOTIDINE 20 MG/1
20 TABLET, FILM COATED ORAL
Refills: 0 | Status: DISCONTINUED | COMMUNITY
End: 2022-10-28

## 2022-10-28 RX ORDER — BETAMETHASONE DIPROPIONATE 0.5 MG/G
0.05 LOTION TOPICAL
Qty: 60 | Refills: 0 | Status: ACTIVE | COMMUNITY
Start: 2022-10-20

## 2022-10-28 RX ORDER — POTASSIUM CHLORIDE 750 MG/1
10 TABLET, EXTENDED RELEASE ORAL
Qty: 30 | Refills: 0 | Status: ACTIVE | COMMUNITY
Start: 2022-10-14

## 2022-10-28 RX ORDER — CLOPIDOGREL BISULFATE 75 MG/1
75 TABLET, FILM COATED ORAL
Refills: 0 | Status: DISCONTINUED | COMMUNITY
End: 2022-10-28

## 2022-10-28 RX ORDER — DULAGLUTIDE 4.5 MG/.5ML
INJECTION, SOLUTION SUBCUTANEOUS
Refills: 0 | Status: DISCONTINUED | COMMUNITY
End: 2022-10-28

## 2022-10-28 RX ORDER — CHLORTHALIDONE 25 MG/1
25 TABLET ORAL
Refills: 0 | Status: DISCONTINUED | COMMUNITY
End: 2022-10-28

## 2022-10-31 ENCOUNTER — MED ADMIN CHARGE (OUTPATIENT)
Age: 72
End: 2022-10-31

## 2022-10-31 PROBLEM — M65.9 TENOSYNOVITIS OF FINGER: Status: ACTIVE | Noted: 2022-10-31

## 2022-10-31 RX ORDER — METHYLPRED ACET/NACL,ISO-OS/PF 40 MG/ML
40 VIAL (ML) INJECTION
Qty: 1 | Refills: 0 | Status: COMPLETED | OUTPATIENT
Start: 2022-10-31

## 2022-10-31 RX ADMIN — METHYLPREDNISOLONE ACETATE MG/ML: 40 INJECTION, SUSPENSION INTRA-ARTICULAR; INTRALESIONAL; INTRAMUSCULAR; SOFT TISSUE at 00:00

## 2022-10-31 NOTE — ASSESSMENT
[FreeTextEntry1] : \par \par 1. 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

## 2022-10-31 NOTE — PHYSICAL EXAM
[General Appearance - Alert] : alert [General Appearance - In No Acute Distress] : in no acute distress [Neck Appearance] : the appearance of the neck was normal [Neck Cervical Mass (___cm)] : no neck mass was observed [Jugular Venous Distention Increased] : there was no jugular-venous distention [Thyroid Diffuse Enlargement] : the thyroid was not enlarged [Thyroid Nodule] : there were no palpable thyroid nodules [Auscultation Breath Sounds / Voice Sounds] : lungs were clear to auscultation bilaterally [Heart Rate And Rhythm] : heart rate was normal and rhythm regular [Heart Sounds] : normal S1 and S2 [Heart Sounds Gallop] : no gallops [Murmurs] : no murmurs [Heart Sounds Pericardial Friction Rub] : no pericardial rub [Edema] : there was no peripheral edema [Full Pulse] : the pedal pulses are present [Bowel Sounds] : normal bowel sounds [Abdomen Soft] : soft [Abdomen Tenderness] : non-tender [Abdomen Mass (___ Cm)] : no abdominal mass palpated [Cervical Lymph Nodes Enlarged Posterior Bilaterally] : posterior cervical [Cervical Lymph Nodes Enlarged Anterior Bilaterally] : anterior cervical [Supraclavicular Lymph Nodes Enlarged Bilaterally] : supraclavicular [No CVA Tenderness] : no ~M costovertebral angle tenderness [No Spinal Tenderness] : no spinal tenderness [Abnormal Walk] : normal gait [Nail Clubbing] : no clubbing  or cyanosis of the fingernails [Musculoskeletal - Swelling] : no joint swelling seen [Motor Tone] : muscle strength and tone were normal [Skin Color & Pigmentation] : normal skin color and pigmentation [Skin Turgor] : normal skin turgor [] : no rash [Oriented To Time, Place, And Person] : oriented to person, place, and time [Impaired Insight] : insight and judgment were intact [Affect] : the affect was normal [FreeTextEntry1] : prayer sign multiple thickkened tendons over the right hand

## 2022-10-31 NOTE — PROCEDURE
[Other Date:___] : Date: [unfilled] [Soft Tissue Injection] : soft tissue injection was performed [Patient] : the patient [Risks] : risks [Consent Obtained] : written consent was obtained prior to the procedure and is detailed in the patient's record [Therapeutic] : therapeutic [#1 Site: ______] : #1 site identified in the [unfilled] [Ethyl Chloride] : ethyl chloride [25 gauge 5/8  inch] : A 25 gauge 5/8 inch needle was used [Depomedrol ___ mg] : Depomedrol [unfilled] mg

## 2022-10-31 NOTE — HISTORY OF PRESENT ILLNESS
[Currently Experiencing] : currently [Arthralgias] : arthralgias [FreeTextEntry1] : new onset of right 3rd digit pain and swelling for about 1 year - report having locking at times\par no imaging available\par \par had right carpal tunnel release surgery in the past\par no other joint complains\par \par denies any psoriasis\par \par \par Denies any fevers, chill, rashes, weight loss,fatigue,  hair loss, joint pains, dry eyes or mouth, mouth sores, Raynaud's. chest pain or SOB, GI or , numbness/tingling\par \par \par \par

## 2022-11-08 ENCOUNTER — APPOINTMENT (OUTPATIENT)
Dept: NEUROLOGY | Facility: CLINIC | Age: 72
End: 2022-11-08

## 2022-11-11 LAB
CCP AB SER IA-ACNC: <8 UNITS
ENA RNP AB SER IA-ACNC: <0.2 AL
ENA SM AB SER IA-ACNC: <0.2 AL
ENA SS-A AB SER IA-ACNC: <0.2 AL
ENA SS-B AB SER IA-ACNC: <0.2 AL
RF+CCP IGG SER-IMP: NEGATIVE
RHEUMATOID FACT SER QL: <10 IU/ML

## 2022-12-09 ENCOUNTER — APPOINTMENT (OUTPATIENT)
Dept: RHEUMATOLOGY | Facility: CLINIC | Age: 72
End: 2022-12-09

## 2022-12-09 ENCOUNTER — APPOINTMENT (OUTPATIENT)
Dept: PULMONOLOGY | Facility: CLINIC | Age: 72
End: 2022-12-09

## 2022-12-09 DIAGNOSIS — M17.10 UNILATERAL PRIMARY OSTEOARTHRITIS, UNSPECIFIED KNEE: ICD-10-CM

## 2022-12-09 PROCEDURE — 99214 OFFICE O/P EST MOD 30 MIN: CPT

## 2022-12-09 PROCEDURE — 99213 OFFICE O/P EST LOW 20 MIN: CPT

## 2022-12-09 NOTE — HISTORY OF PRESENT ILLNESS
[___ Week(s) Ago] : [unfilled] week(s) ago [FreeTextEntry1] : hands - x-rays with scattered OA - better since injection - no locking\par labs are negative for auto-immune rheumatic condition. \par ongoing knee pain - worse with stairs up and down [Currently Experiencing] : currently [Arthralgias] : arthralgias

## 2022-12-09 NOTE — ASSESSMENT
[FreeTextEntry1] : \par \par 1. 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 knee pain - send for x-rays - likely OA\par \par \par \par I reviewed previous labs results with patients.\par Diagnosis and Prognosis discussed\par Continue with current medications\par medications refilled\par education provided on knee and hand exercise\par F/u 2 months\par

## 2022-12-09 NOTE — PHYSICAL EXAM
[General Appearance - Alert] : alert [General Appearance - In No Acute Distress] : in no acute distress [Neck Appearance] : the appearance of the neck was normal [Neck Cervical Mass (___cm)] : no neck mass was observed [Jugular Venous Distention Increased] : there was no jugular-venous distention [Thyroid Diffuse Enlargement] : the thyroid was not enlarged [Thyroid Nodule] : there were no palpable thyroid nodules [Auscultation Breath Sounds / Voice Sounds] : lungs were clear to auscultation bilaterally [Heart Rate And Rhythm] : heart rate was normal and rhythm regular [Heart Sounds] : normal S1 and S2 [Heart Sounds Gallop] : no gallops [Murmurs] : no murmurs [Heart Sounds Pericardial Friction Rub] : no pericardial rub [Full Pulse] : the pedal pulses are present [Edema] : there was no peripheral edema [Bowel Sounds] : normal bowel sounds [Abdomen Soft] : soft [Abdomen Tenderness] : non-tender [Abdomen Mass (___ Cm)] : no abdominal mass palpated [Cervical Lymph Nodes Enlarged Posterior Bilaterally] : posterior cervical [Cervical Lymph Nodes Enlarged Anterior Bilaterally] : anterior cervical [Supraclavicular Lymph Nodes Enlarged Bilaterally] : supraclavicular [No CVA Tenderness] : no ~M costovertebral angle tenderness [No Spinal Tenderness] : no spinal tenderness [Abnormal Walk] : normal gait [Nail Clubbing] : no clubbing  or cyanosis of the fingernails [Musculoskeletal - Swelling] : no joint swelling seen [Motor Tone] : muscle strength and tone were normal [Skin Color & Pigmentation] : normal skin color and pigmentation [Skin Turgor] : normal skin turgor [] : no rash [Oriented To Time, Place, And Person] : oriented to person, place, and time [Impaired Insight] : insight and judgment were intact [Affect] : the affect was normal [FreeTextEntry1] : no locking - no swelling over the joints - bialteral knee with pain on flexion

## 2022-12-11 NOTE — DISCUSSION/SUMMARY
[FreeTextEntry1] : 72 year old woman with dyspnea.  PFT  demonstrates restriction with  positive bronchodilator response that suggests obstructive component\par \par Pt has elevated BMI\par \par Lower extremity edema.\par \par CT demonstrated enlarged Pulmonary artery,  suggest pulmonary HTN.\par \par She has some cough and mucus production\par \par She had been using ICS LABA with some relief.   However she did not obtain refill due to cost\par \par \par She states that she does not know how to use albuterol\par \par She is concerned about hair loss.  She is told of "inflammation"\par \par \par PLAN\par \par restart ICS LABA and albuterol as needed \par \par continue cardiac regimen\par \par review echocardiogram\par \par follow with cardiology\par \par Total time spent : 30 minutes\par Including:\par Preparation prior to visit - Reviewing prior record, results of tests and Consultation Reports as applicable\par Conducting an appropriate H & P during today's encounter\par Appropriate orders for tests, medications and procedures, as applicable\par Counseling patient \par Note completion \par \par Cristian Choe MD\par \par

## 2022-12-11 NOTE — HISTORY OF PRESENT ILLNESS
[Never] : never [TextBox_4] : 72 year old patient presents for evaluation of recent onset of wheezing.  She notes dyspnea on exertion and wheeze with walking.  \par \par She reports she has  a lot of mucus from her throat. \par \par She has DM, HTN.  She also reports LE edema. \par \par No chest pain. \par \par She has been evaluated by her cardiologist Dr. Hernandez. \par \par CXR 1/25/22 was reported as normal. \par \par \par She was started on Advair 250, with some relief\par \par She used ICS LABA but ran out 3 months ago.\par \par She is still having mucus production and dyspnea on exertion   She notes some wheeze.\par \par She denies allergies.\par \par She still has heavy breathing and dyspnea on exertion\par \par She has pedal edema\par \par CT chest suggests possible pulmonary HTN.\par \par She has had pneumonia vaccine about 1 year ago\par \par Primary doctor is Dr Hill\par \par Cardiology Lupe Hernandez  FAX: [phone]\par PSH:\par \par C section\par \par bowel obstruction\par carpal tunnel\par \par PMH:\par \par HTN\par \par DM on insulin, Trulicity, metformin, pioglitazone, \par HLD on rosuvastatin\par \par GERD\par \par \par \par \par SH:\par \par never smoker\par \par ETOH:  none\par \par \par Occupation: retired, worked as nurses assistant\par \par No exposure to chemicals, dust, asbestos, mold\par \par \par \par ALLERGY:\par \par NKDA\par \par environmental/seasonal allergy: none\par \par \par \par Review of Systems:\par \par \par no sinusitis, sinus infections, nasal obstruction\par no dysphagia\par no dry mouth\par \par mild arthritis\par no joint aches\par no joint swelling\par \par \par no pneumonia\par no wheeze\par no lung cancer\par \par no CAD\par no MI\par no chest pain\par no murmur\par no CHF\par no HTN\par no edema\par \par no peptic ulcer or gastritis\par \par no liver disease\par \par \par no thyroid disease\par \par no bleeding\par \par no DVT or PE\par \par no kidney disease\par \par no stroke\par no seizure\par \par \par \par \par \par \par \par \par \par \par \par   [Hypersomnolence] : denies hypersomnolence [Snoring] : no snoring [Witnessed Apneas] : no witnessed apneas

## 2023-01-13 ENCOUNTER — APPOINTMENT (OUTPATIENT)
Dept: PULMONOLOGY | Facility: CLINIC | Age: 73
End: 2023-01-13
Payer: MEDICARE

## 2023-01-13 VITALS
HEART RATE: 81 BPM | OXYGEN SATURATION: 96 % | WEIGHT: 209 LBS | TEMPERATURE: 97.1 F | RESPIRATION RATE: 16 BRPM | SYSTOLIC BLOOD PRESSURE: 106 MMHG | DIASTOLIC BLOOD PRESSURE: 64 MMHG | HEIGHT: 65 IN | BODY MASS INDEX: 34.82 KG/M2

## 2023-01-13 DIAGNOSIS — R06.00 DYSPNEA, UNSPECIFIED: ICD-10-CM

## 2023-01-13 PROCEDURE — 99214 OFFICE O/P EST MOD 30 MIN: CPT

## 2023-01-13 RX ORDER — FLUTICASONE PROPIONATE AND SALMETEROL 250; 50 UG/1; UG/1
250-50 POWDER RESPIRATORY (INHALATION)
Qty: 1 | Refills: 5 | Status: ACTIVE | COMMUNITY
Start: 2022-03-11 | End: 1900-01-01

## 2023-01-13 NOTE — HISTORY OF PRESENT ILLNESS
[Never] : never [TextBox_4] : 72 year old patient presents for evaluation of recent onset of wheezing.  She notes dyspnea on exertion and wheeze with walking.  \par \par She reports she has  a lot of mucus from her throat. \par \par She has DM, HTN.  She also reports LE edema. \par \par No chest pain. \par \par She has been evaluated by her cardiologist Dr. Hernandez. \par \par CXR 1/25/22 was reported as normal. \par \par \par She has been using Advair 250/50  twice per day  with some relief\par The cost is high.\par \par \par She is still having mucus production and dyspnea on exertion   She notes some wheeze.\par \par She denies allergies.\par \par She still has heavy breathing and dyspnea on exertion\par \par She has pedal edema\par \par CT chest suggests possible pulmonary HTN.\par \par She has had pneumonia vaccine about 1 year ago\par \par Primary doctor is Dr Hill\par \par Cardiology Lupe Hernandez  FAX: [phone]\par PSH:\par \par C section\par \par bowel obstruction\par carpal tunnel\par \par PMH:\par \par HTN\par \par DM on insulin, Trulicity, metformin, pioglitazone, \par HLD on rosuvastatin\par \par GERD\par \par \par \par \par SH:\par \par never smoker\par \par ETOH:  none\par \par \par Occupation: retired, worked as nurses assistant\par \par No exposure to chemicals, dust, asbestos, mold\par \par \par \par ALLERGY:\par \par NKDA\par \par environmental/seasonal allergy: none\par \par \par \par Review of Systems:\par \par \par no sinusitis, sinus infections, nasal obstruction\par no dysphagia\par no dry mouth\par \par mild arthritis\par no joint aches\par no joint swelling\par \par \par no pneumonia\par no wheeze\par no lung cancer\par \par no CAD\par no MI\par no chest pain\par no murmur\par no CHF\par no HTN\par no edema\par \par no peptic ulcer or gastritis\par \par no liver disease\par \par \par no thyroid disease\par \par no bleeding\par \par no DVT or PE\par \par no kidney disease\par \par no stroke\par no seizure\par \par \par \par \par \par \par \par \par \par \par \par   [Hypersomnolence] : denies hypersomnolence [Snoring] : no snoring [Witnessed Apneas] : no witnessed apneas

## 2023-01-13 NOTE — DISCUSSION/SUMMARY
[FreeTextEntry1] : 72 year old woman with dyspnea.  PFT  demonstrates restriction with  positive bronchodilator response that suggests obstructive component\par \par Pt has elevated BMI\par \par Lower extremity edema.  Varicose veins treated\par \par CT demonstrated enlarged Pulmonary artery,  suggest pulmonary HTN.\par \par She has some cough and mucus production\par \par She had been using ICS LABA with some relief.   However she did not obtain refill due to cost\par \par I have called pharmacy for her and ordered covered med\par \par Has had echocardiogram in 2021.  i have called for results\par \par Also requested records of recent visit with cardiologist\par PLAN\par \par restart ICS LABA and albuterol as needed \par \par continue cardiac regimen\par \par review echocardiogram\par \par follow with cardiology\par \par Will request echo report\par \par Total time spent : 30 minutes\par Including:\par Preparation prior to visit - Reviewing prior record, results of tests and Consultation Reports as applicable\par Conducting an appropriate H & P during today's encounter\par Appropriate orders for tests, medications and procedures, as applicable\par Counseling patient \par Note completion \par \par Cristian Choe MD\par \par

## 2023-04-14 ENCOUNTER — APPOINTMENT (OUTPATIENT)
Dept: PULMONOLOGY | Facility: CLINIC | Age: 73
End: 2023-04-14
Payer: MEDICARE

## 2023-04-14 VITALS
BODY MASS INDEX: 34.99 KG/M2 | SYSTOLIC BLOOD PRESSURE: 109 MMHG | HEIGHT: 65 IN | OXYGEN SATURATION: 97 % | DIASTOLIC BLOOD PRESSURE: 61 MMHG | TEMPERATURE: 97.9 F | WEIGHT: 210 LBS | RESPIRATION RATE: 16 BRPM | HEART RATE: 76 BPM

## 2023-04-14 DIAGNOSIS — J45.909 UNSPECIFIED ASTHMA, UNCOMPLICATED: ICD-10-CM

## 2023-04-14 PROCEDURE — 99214 OFFICE O/P EST MOD 30 MIN: CPT

## 2023-04-14 RX ORDER — PIOGLITAZONE HYDROCHLORIDE 45 MG/1
TABLET ORAL
Refills: 0 | Status: DISCONTINUED | COMMUNITY
End: 2023-04-14

## 2023-04-14 NOTE — DISCUSSION/SUMMARY
[FreeTextEntry1] : 72 year old woman with dyspnea.  PFT  demonstrates restriction with  positive bronchodilator response that suggests obstructive component\par \par Pt has elevated BMI\par \par Lower extremity edema.  Varicose veins treated\par \par CT demonstrated enlarged Pulmonary artery,  suggest pulmonary HTN.\par \par Echo from 2021 demonstrated normal pulmonary pressures\par \par She has some cough and mucus production\par \par She had been using ICS LABA with some relief.   States she has been using it\par She takes PPI for GERD, states she is well controlled\par \par Unclear why she has had mucus in her throat.  Denies allergy, GERD symptoms\par \par \par PLAN\par \par Trial of saline nasal wash  extra strength and then fluticasone nasal spray if unimproved\par \par Continue  ICS LABA twice daily, and albuterol as needed \par \par continue cardiac regimen\par \par Total time spent : 30 minutes\par Including:\par Preparation prior to visit - Reviewing prior record, results of tests and Consultation Reports as applicable\par Conducting an appropriate H & P during today's encounter\par Appropriate orders for tests, medications and procedures, as applicable\par Counseling patient \par Note completion \par \par Cristian Choe MD\par \par

## 2023-04-14 NOTE — HISTORY OF PRESENT ILLNESS
[Never] : never [TextBox_4] : 72 year old patient presents for evaluation of recent onset of wheezing.  She notes dyspnea on exertion and wheeze with walking.  \par \par She reports she has  a lot of mucus from her throat. \par \par She has DM, HTN.  She also reports LE edema. \par \par No chest pain. \par \par She has been evaluated by her cardiologist Dr. Hernandez. \par \par CXR 1/25/22 was reported as normal. \par \par \par She has been using Advair 250/50  twice per day  with some relief\par The cost is high.\par \par \par She is still having mucus production and dyspnea on exertion   She notes some wheeze.\par \par She denies allergies.\par \par She still has heavy breathing and dyspnea on exertion\par \par She has pedal edema\par \par CT chest suggests possible pulmonary HTN.\par \par She has had pneumonia vaccine about 1 year ago\par \par \par Having mucus production, white or yellow.  Some .sob, no wheeze, no significant\par \par Has been using Advair BID\par \par Denies allergy symptoms\par \par Primary doctor is Dr Hill\par \par Cardiology Lu[facility] Hernandez  FAX: [phone]\par PSH:\par \par C section\par \par bowel obstruction\par carpal tunnel\par \par PMH:\par \par HTN\par \par DM on insulin, Trulicity, metformin, pioglitazone, \par HLD on rosuvastatin\par \par GERD\par \par \par \par \par SH:\par \par never smoker\par \par ETOH:  none\par \par \par Occupation: retired, worked as nurses assistant\par \par No exposure to chemicals, dust, asbestos, mold\par \par \par \par ALLERGY:\par \par NKDA\par \par environmental/seasonal allergy: none\par \par \par \par Review of Systems:\par \par \par no sinusitis, sinus infections, nasal obstruction\par no dysphagia\par no dry mouth\par \par mild arthritis\par no joint aches\par no joint swelling\par \par \par no pneumonia\par no wheeze\par no lung cancer\par \par no CAD\par no MI\par no chest pain\par no murmur\par no CHF\par no HTN\par no edema\par \par no peptic ulcer or gastritis\par \par no liver disease\par \par \par no thyroid disease\par \par no bleeding\par \par no DVT or PE\par \par no kidney disease\par \par no stroke\par no seizure\par \par \par \par \par \par \par \par \par \par \par \par   [Hypersomnolence] : denies hypersomnolence [Snoring] : no snoring [Witnessed Apneas] : no witnessed apneas

## 2023-10-01 PROBLEM — I63.81 LACUNAR INFARCTION: Status: ACTIVE | Noted: 2020-07-24

## 2023-10-13 ENCOUNTER — APPOINTMENT (OUTPATIENT)
Dept: PULMONOLOGY | Facility: CLINIC | Age: 73
End: 2023-10-13
Payer: MEDICARE

## 2023-10-13 VITALS
BODY MASS INDEX: 34.49 KG/M2 | WEIGHT: 207 LBS | DIASTOLIC BLOOD PRESSURE: 64 MMHG | RESPIRATION RATE: 16 BRPM | OXYGEN SATURATION: 98 % | SYSTOLIC BLOOD PRESSURE: 134 MMHG | HEIGHT: 65 IN | TEMPERATURE: 98 F | HEART RATE: 69 BPM

## 2023-10-13 DIAGNOSIS — Z23 ENCOUNTER FOR IMMUNIZATION: ICD-10-CM

## 2023-10-13 DIAGNOSIS — J98.4 OTHER DISORDERS OF LUNG: ICD-10-CM

## 2023-10-13 DIAGNOSIS — J45.909 UNSPECIFIED ASTHMA, UNCOMPLICATED: ICD-10-CM

## 2023-10-13 PROCEDURE — 99214 OFFICE O/P EST MOD 30 MIN: CPT | Mod: 25

## 2023-10-13 PROCEDURE — G0008: CPT

## 2023-10-13 PROCEDURE — 90662 IIV NO PRSV INCREASED AG IM: CPT

## 2024-02-09 ENCOUNTER — APPOINTMENT (OUTPATIENT)
Dept: PULMONOLOGY | Facility: CLINIC | Age: 74
End: 2024-02-09
Payer: MEDICARE

## 2024-02-09 VITALS
RESPIRATION RATE: 16 BRPM | TEMPERATURE: 97.3 F | HEIGHT: 65 IN | DIASTOLIC BLOOD PRESSURE: 64 MMHG | WEIGHT: 194 LBS | SYSTOLIC BLOOD PRESSURE: 113 MMHG | OXYGEN SATURATION: 97 % | BODY MASS INDEX: 32.32 KG/M2 | HEART RATE: 71 BPM

## 2024-02-09 DIAGNOSIS — R10.13 EPIGASTRIC PAIN: ICD-10-CM

## 2024-02-09 DIAGNOSIS — L85.3 XEROSIS CUTIS: ICD-10-CM

## 2024-02-09 PROCEDURE — 99214 OFFICE O/P EST MOD 30 MIN: CPT

## 2024-02-09 RX ORDER — FAMOTIDINE 20 MG/1
20 TABLET, FILM COATED ORAL DAILY
Qty: 90 | Refills: 1 | Status: DISCONTINUED | COMMUNITY
Start: 2024-02-09 | End: 2024-02-09

## 2024-02-09 RX ORDER — SIMETHICONE 125 MG/1
125 TABLET, CHEWABLE ORAL
Qty: 60 | Refills: 2 | Status: ACTIVE | COMMUNITY
Start: 2024-02-09 | End: 1900-01-01

## 2024-02-09 RX ORDER — AMMONIUM LACTATE 5 %
5 LOTION (GRAM) TOPICAL TWICE DAILY
Qty: 1 | Refills: 2 | Status: ACTIVE | COMMUNITY
Start: 2024-02-09 | End: 1900-01-01

## 2024-02-09 RX ORDER — FLUTICASONE PROPIONATE AND SALMETEROL 250; 50 UG/1; UG/1
250-50 POWDER RESPIRATORY (INHALATION)
Qty: 1 | Refills: 5 | Status: ACTIVE | COMMUNITY
Start: 2022-12-09 | End: 1900-01-01

## 2024-02-09 NOTE — DISCUSSION/SUMMARY
[FreeTextEntry1] : 73 year old woman with dyspnea.  PFT  demonstrates restriction with  positive bronchodilator response that suggests obstructive component  Pt has elevated BMI  Lower extremity edema.  Varicose veins treated, remains with mild edema  CT demonstrated enlarged Pulmonary artery,  suggest pulmonary HTN.  Echo from 2021 demonstrated normal pulmonary pressures  She has some cough and mucus production  She had been using ICS LABA with some relief.   States she has been using it intermittently    Denies allergy, GERD symptoms  She does still have some mucus production  She takes PPI for GERD, states she is well controlled    PLAN  encourage use of saline nasal wash extra strength and then fluticasone nasal spray if unimproved  Use  ICS LABA once or twice daily, and albuterol as needed   Encourage regular use.   reports GI bloating.  recommend famotidine 20 mg daily and simethicone   continue cardiac regimen  reviewed medication, bumetanide, nifedipine, carvedilol and losartan  Will follow with cardiology and nephrology  Recommend vascular follow up  CT chest 04/2022 did not demonstrate any suspicious nodules  Total time spent : 30 minutes Including: Preparation prior to visit - Reviewing prior record, results of tests and Consultation Reports as applicable Conducting an appropriate H & P during today's encounter Appropriate orders for tests, medications and procedures, as applicable Counseling patient  Note completion   Cristian Choe MD

## 2024-02-09 NOTE — HISTORY OF PRESENT ILLNESS
[Never] : never [TextBox_4] : 73 year old patient presents for evaluation of recent onset of wheezing.  She notes dyspnea on exertion and wheeze with walking.    She reports she has  a lot of mucus from her throat.   She has DM, HTN.  She also reports LE edema.   No chest pain.   She has been evaluated by her cardiologist Dr. Hernandez.   CXR 1/25/22 was reported as normal.    She was prescribed Advair 250/50  twice per day  used with some relief but not using consistently The cost is high.   She is still having mucus production and dyspnea on exertion   She notes some wheeze.  She denies allergies.  She still has heavy breathing and dyspnea on exertion  She has pedal edema  CT chest suggests possible pulmonary HTN.  She has had pneumonia vaccine about 1 year ago   Having mucus production, white or yellow.  Some .sob, no wheeze, no significant  Has been using Advair BID  Denies allergy symptoms  Primary doctor is Dr Hill  Cardiology [facility]  FAX: [phone] PSH:  C section  bowel obstruction carpal tunnel  PMH:  HTN  DM on insulin, Trulicity, metformin, pioglitazone,  HLD on rosuvastatin  GERD     SH:  never smoker  ETOH:  none   Occupation: retired, worked as nurses assistant  No exposure to chemicals, dust, asbestos, mold    ALLERGY:  NKDA  environmental/seasonal allergy: none    Review of Systems:   no sinusitis, sinus infections, nasal obstruction no dysphagia no dry mouth  mild arthritis no joint aches no joint swelling   no pneumonia no wheeze no lung cancer  no CAD no MI no chest pain no murmur no CHF no HTN no edema  no peptic ulcer or gastritis  no liver disease   no thyroid disease  no bleeding  no DVT or PE  no kidney disease  no stroke no seizure              [Hypersomnolence] : denies hypersomnolence [Snoring] : no snoring [Witnessed Apneas] : no witnessed apneas

## 2024-08-23 NOTE — PRE-ANESTHESIA EVALUATION ADULT - NSANTHSNORERD_ENT_A_CORE
EXAM DESCRIPTION:  U/S ABDOMEN LIMITED W/O DOP



COMPLETED DATE/TIME:  2/8/2018 5:39 pm



REASON FOR STUDY:  RUQ pain



COMPARISON:  None.



TECHNIQUE:  Dynamic and static grayscale images acquired of the abdomen and recorded on PACS. Additio
nal selected color Doppler and spectral images recorded.



LIMITATIONS:  None.



FINDINGS:  PANCREAS: No masses.  Visualized pancreatic duct normal caliber.

LIVER: No masses. Echotexture normal.

LIVER VASCULATURE: Normal blood flow is identified in the portal vein.

GALLBLADDER: No stones. Normal wall thickness. No pericholecystic fluid.

ULTRASOUND-DETECTED NORMAN'S SIGN: Negative.

INTRAHEPATIC DUCTS AND COMMON DUCT: No dilated intrahepatic bile ducts are identified.  There is some
 mild dilatation of the common bile duct measuring 9.6 mm.

INFERIOR VENA CAVA: Normal flow.

AORTA: No aneurysm.

RIGHT KIDNEY:  Normal size. Normal echogenicity. No solid or suspicious masses. No hydronephrosis. No
 calcifications.

PERITONEAL AND RIGHT PLEURAL SPACE: No ascites or effusions.

OTHER: No other significant findings.



IMPRESSION:  No gallstones are identified.  No dilated intrahepatic bile ducts are identified.  There
 is some mild dilatation of the common bile duct measuring 9.6 mm.  Other findings as noted above



TECHNICAL DOCUMENTATION:  JOB ID:  [id number]

 2011 [employer]- All Rights Reserved No No no

## 2024-09-16 ENCOUNTER — NON-APPOINTMENT (OUTPATIENT)
Age: 74
End: 2024-09-16

## 2024-09-17 ENCOUNTER — APPOINTMENT (OUTPATIENT)
Dept: NEUROLOGY | Facility: CLINIC | Age: 74
End: 2024-09-17
Payer: MEDICARE

## 2024-09-17 VITALS
TEMPERATURE: 98.2 F | OXYGEN SATURATION: 100 % | HEIGHT: 65 IN | DIASTOLIC BLOOD PRESSURE: 62 MMHG | WEIGHT: 190 LBS | SYSTOLIC BLOOD PRESSURE: 112 MMHG | BODY MASS INDEX: 31.65 KG/M2 | HEART RATE: 69 BPM

## 2024-09-17 DIAGNOSIS — I63.81 OTHER CEREBRAL INFARCTION DUE TO OCCLUSION OR STENOSIS OF SMALL ARTERY: ICD-10-CM

## 2024-09-17 DIAGNOSIS — R20.0 ANESTHESIA OF SKIN: ICD-10-CM

## 2024-09-17 DIAGNOSIS — R42 DIZZINESS AND GIDDINESS: ICD-10-CM

## 2024-09-17 DIAGNOSIS — R20.2 ANESTHESIA OF SKIN: ICD-10-CM

## 2024-09-17 DIAGNOSIS — R26.81 UNSTEADINESS ON FEET: ICD-10-CM

## 2024-09-17 DIAGNOSIS — G56.03 CARPAL TUNNEL SYNDROM,BILATERAL UPPER LIMBS: ICD-10-CM

## 2024-09-17 PROCEDURE — 99215 OFFICE O/P EST HI 40 MIN: CPT

## 2024-09-17 PROCEDURE — G2211 COMPLEX E/M VISIT ADD ON: CPT

## 2024-09-17 RX ORDER — DAPAGLIFLOZIN AND METFORMIN HYDROCHLORIDE 2.5; 1 MG/1; MG/1
2.5-1 TABLET, FILM COATED, EXTENDED RELEASE ORAL
Refills: 0 | Status: ACTIVE | COMMUNITY

## 2024-09-17 RX ORDER — ASPIRIN 81 MG
81 TABLET, DELAYED RELEASE (ENTERIC COATED) ORAL
Refills: 0 | Status: ACTIVE | COMMUNITY

## 2024-09-17 RX ORDER — INSULIN DEGLUDEC INJECTION 100 U/ML
100 INJECTION, SOLUTION SUBCUTANEOUS
Refills: 0 | Status: ACTIVE | COMMUNITY

## 2024-09-17 RX ORDER — CARVEDILOL 25 MG/1
25 TABLET, FILM COATED ORAL
Refills: 0 | Status: ACTIVE | COMMUNITY

## 2024-09-17 RX ORDER — ESOMEPRAZOLE MAGNESIUM 40 MG/1
40 CAPSULE, DELAYED RELEASE ORAL
Refills: 0 | Status: ACTIVE | COMMUNITY

## 2024-09-17 RX ORDER — ROSUVASTATIN CALCIUM 5 MG/1
5 TABLET, FILM COATED ORAL
Refills: 0 | Status: ACTIVE | COMMUNITY

## 2024-09-17 RX ORDER — LOSARTAN POTASSIUM 100 MG/1
100 TABLET, FILM COATED ORAL
Refills: 0 | Status: ACTIVE | COMMUNITY

## 2024-09-17 RX ORDER — EZETIMIBE 10 MG/1
10 TABLET ORAL
Refills: 0 | Status: ACTIVE | COMMUNITY

## 2024-09-17 RX ORDER — POTASSIUM CHLORIDE 10 MEQ
10 CAPSULE, EXTENDED RELEASE ORAL
Refills: 0 | Status: ACTIVE | COMMUNITY

## 2024-09-17 RX ORDER — NIFEDIPINE 60 MG
60 TABLET, EXTENDED RELEASE ORAL
Refills: 0 | Status: ACTIVE | COMMUNITY

## 2024-09-17 RX ORDER — BUMETANIDE 1 MG/1
1 TABLET ORAL
Refills: 0 | Status: ACTIVE | COMMUNITY

## 2024-09-17 RX ORDER — DULAGLUTIDE 4.5 MG/.5ML
4.5 INJECTION, SOLUTION SUBCUTANEOUS
Refills: 0 | Status: ACTIVE | COMMUNITY

## 2024-09-17 NOTE — PHYSICAL EXAM
[General Appearance - Alert] : alert [General Appearance - In No Acute Distress] : in no acute distress [Person] : oriented to person [Place] : oriented to place [Time] : oriented to time [Concentration Intact] : normal concentrating ability [Naming Objects] : no difficulty naming common objects [Fluency] : fluency intact [Comprehension] : comprehension intact [Vocabulary] : adequate range of vocabulary [Cranial Nerves Optic (II)] : visual acuity intact bilaterally,  visual fields full to confrontation, pupils equal round and reactive to light [Cranial Nerves Oculomotor (III)] : extraocular motion intact [Cranial Nerves Trigeminal (V)] : facial sensation intact symmetrically [Cranial Nerves Facial (VII)] : face symmetrical [Motor Tone] : muscle tone was normal in all four extremities [Motor Strength] : muscle strength was normal in all four extremities [Involuntary Movements] : no involuntary movements were seen [Sensation Tactile Decrease] : light touch was intact [Romberg's Sign] : a positive Romberg's sign was present [Abnormal Walk] : normal gait [Balance] : balance was intact [Coordination - Dysmetria Impaired Finger-to-Nose Bilateral] : present bilaterally [Coordination - Dysmetria Impaired Heel-to-Shin Bilateral] : present bilaterally

## 2024-09-17 NOTE — HISTORY OF PRESENT ILLNESS
[FreeTextEntry1] : The patient has DM and here for follow-up care for stroke. The patient had an event of LOC about 20+years ago, she was taken to [facility] and was told that she had a stroke. She was started on Plavix, as she had the stroke when she was taking aspirin 81mg but I do not more records are not available. After last visit, the patient switched from Plavix to aspirin and continued pravastatin 5mg for secondary stroke prevention. She has not had any further stroke symptoms.    She is right handed and has had numbness and tingling in both hands for years. She also has cramping in the hands as well as right forearm pain. She has received injections in her hands without improvement. She notes that her hands are worsening. She helped so a hand specialist and has had carpal tunnel release with good improvement of stiffness. She does the hand exercises at home.  The patient is complaining of vertigo sensation, which comes and goes at times. It is bothersome..

## 2024-09-17 NOTE — HISTORY OF PRESENT ILLNESS
[FreeTextEntry1] : The patient has DM and here for follow-up care for stroke. The patient had an event of LOC about 20+years ago, she was taken to [facility] and was told that she had a stroke. She was started on Plavix, as she had the stroke when she was taking aspirin 81mg but I do not more records are not available. After last visit, the patient switched from Plavix to aspirin and continued pravastatin 5mg for secondary stroke prevention. She has not had any further stroke symptoms.    She is right handed and has had numbness and tingling in both hands for years. She also has cramping in the hands as well as right forearm pain. She has received injections in her hands without improvement. She notes that her hands are worsening. She helped so a hand specialist and has had carpal tunnel release with good improvement of stiffness. She does the hand exercises at home.  The patient is complaining of vertigo sensation, which comes and goes at times. It is bothersome..

## 2024-09-17 NOTE — ASSESSMENT
[FreeTextEntry1] :  balance problems in patient with diabetes concerning for possible peripheral neuropathy, however given the patient's history of vertigo as well as balance problems and prior stroke will obtain MRI of the brain to rule out central gait/balance problems.  chronic left cerebellar cva, Lacunar stroke   Will check LDL and hemoglobin A1c levels for now, will cw aspirin 81 mg and Pravastatin 5mg for secondary stroke prevention Blood pressure goal is normal Patient advised on the importance of lifestyle modification such as weight control, blood sugar control control of hypertension as well as exercise and prevention of further strokes. We will refer the patient to nutritionist.  Bilateral numbness and tingling in the hands, found to have bl APB weakness, also c/o right arm pain with [email or web address] showing moderate CTS with demyelinating features bilaterally, now doing well s/p right side median nerve release. The patient does not have any symptoms of carpal tunnel syndrome, however she notices joint pains in her MCP and PIP joints in digits 3 bilaterally in the hands, she declines referral to rheumatology.  Dizziness, now described as vertigo, likely due to peripheral vertigo. Will start meclizine 12.5 mg 1 to 2 tablets 3 times a day will refer the patient to OT for further evaluation.  I spent the time noted on the day of this patient encounter preparing for, providing and documenting the above E/M service and counseling and educate patient on differential, workup, disease course, and treatment/management. Education was provided to the patient during this encounter. All questions and concerns were answered and addressed in detail. The patient verbalized understanding and agreed to plan. Patient was advised to continue to monitor for neurologic symptoms and to notify my office or go to the nearest emergency room if there are any changes. Any orders/referrals and communications were provided as well.  Side effects of the above medications were discussed in detail including but not limited to applicable black box warning and teratogenicity as appropriate.  Patient was advised to bring previous records to my office, including CD of imaging, when applicable.

## 2024-09-23 ENCOUNTER — APPOINTMENT (OUTPATIENT)
Dept: MRI IMAGING | Facility: CLINIC | Age: 74
End: 2024-09-23
Payer: MEDICARE

## 2024-09-23 LAB
CHOLEST SERPL-MCNC: 112 MG/DL
ESTIMATED AVERAGE GLUCOSE: 134 MG/DL
HBA1C MFR BLD HPLC: 6.3 %
HDLC SERPL-MCNC: 47 MG/DL
LDLC SERPL CALC-MCNC: 53 MG/DL
NONHDLC SERPL-MCNC: 66 MG/DL
TRIGL SERPL-MCNC: 54 MG/DL

## 2024-09-23 PROCEDURE — 70551 MRI BRAIN STEM W/O DYE: CPT

## 2024-12-12 ENCOUNTER — APPOINTMENT (OUTPATIENT)
Dept: NEUROLOGY | Facility: CLINIC | Age: 74
End: 2024-12-12
Payer: MEDICARE

## 2024-12-12 VITALS
OXYGEN SATURATION: 98 % | TEMPERATURE: 98 F | BODY MASS INDEX: 32.49 KG/M2 | DIASTOLIC BLOOD PRESSURE: 72 MMHG | HEIGHT: 65 IN | SYSTOLIC BLOOD PRESSURE: 147 MMHG | HEART RATE: 70 BPM | WEIGHT: 195 LBS

## 2024-12-12 DIAGNOSIS — I63.81 OTHER CEREBRAL INFARCTION DUE TO OCCLUSION OR STENOSIS OF SMALL ARTERY: ICD-10-CM

## 2024-12-12 DIAGNOSIS — R20.0 ANESTHESIA OF SKIN: ICD-10-CM

## 2024-12-12 DIAGNOSIS — E11.42 TYPE 2 DIABETES MELLITUS WITH DIABETIC POLYNEUROPATHY: ICD-10-CM

## 2024-12-12 DIAGNOSIS — R20.2 ANESTHESIA OF SKIN: ICD-10-CM

## 2024-12-12 DIAGNOSIS — M54.9 DORSALGIA, UNSPECIFIED: ICD-10-CM

## 2024-12-12 DIAGNOSIS — E11.9 TYPE 2 DIABETES MELLITUS W/OUT COMPLICATIONS: ICD-10-CM

## 2024-12-12 DIAGNOSIS — R42 DIZZINESS AND GIDDINESS: ICD-10-CM

## 2024-12-12 PROCEDURE — 99215 OFFICE O/P EST HI 40 MIN: CPT

## 2024-12-12 PROCEDURE — 95911 NRV CNDJ TEST 9-10 STUDIES: CPT

## 2024-12-13 LAB
ESTIMATED AVERAGE GLUCOSE: 154 MG/DL
FOLATE SERPL-MCNC: 16.1 NG/ML
HBA1C MFR BLD HPLC: 7 %
T3 SERPL-MCNC: 110 NG/DL
T4 SERPL-MCNC: 7.5 UG/DL
TSH SERPL-ACNC: 0.53 UIU/ML
VIT B12 SERPL-MCNC: 1055 PG/ML

## 2024-12-15 LAB
ALBUMIN MFR SERPL ELPH: 53.4 %
ALBUMIN SERPL-MCNC: 4.1 G/DL
ALBUMIN/GLOB SERPL: 1.1 RATIO
ALPHA1 GLOB MFR SERPL ELPH: 3.4 %
ALPHA1 GLOB SERPL ELPH-MCNC: 0.3 G/DL
ALPHA2 GLOB MFR SERPL ELPH: 9.8 %
ALPHA2 GLOB SERPL ELPH-MCNC: 0.8 G/DL
B-GLOBULIN MFR SERPL ELPH: 12.6 %
B-GLOBULIN SERPL ELPH-MCNC: 1 G/DL
GAMMA GLOB FLD ELPH-MCNC: 1.6 G/DL
GAMMA GLOB MFR SERPL ELPH: 20.8 %
INTERPRETATION SERPL IEP-IMP: NORMAL
M PROTEIN MFR SERPL ELPH: 4.6 %
M PROTEIN SPEC IFE-MCNC: NORMAL
MONOCLON BAND OBS SERPL: 0.4 G/DL
PROT SERPL-MCNC: 7.7 G/DL
PROT SERPL-MCNC: 7.7 G/DL

## 2024-12-17 LAB — METHYLMALONATE SERPL-SCNC: 227 NMOL/L

## 2025-01-02 ENCOUNTER — APPOINTMENT (OUTPATIENT)
Dept: NEUROLOGY | Facility: CLINIC | Age: 75
End: 2025-01-02
Payer: MEDICARE

## 2025-01-02 VITALS
OXYGEN SATURATION: 100 % | HEIGHT: 65 IN | BODY MASS INDEX: 30.82 KG/M2 | HEART RATE: 78 BPM | SYSTOLIC BLOOD PRESSURE: 147 MMHG | TEMPERATURE: 97.1 F | DIASTOLIC BLOOD PRESSURE: 76 MMHG | WEIGHT: 185 LBS

## 2025-01-02 DIAGNOSIS — R20.0 ANESTHESIA OF SKIN: ICD-10-CM

## 2025-01-02 DIAGNOSIS — R20.2 ANESTHESIA OF SKIN: ICD-10-CM

## 2025-01-02 DIAGNOSIS — R89.9 UNSPECIFIED ABNORMAL FINDING IN SPECIMENS FROM OTHER ORGANS, SYSTEMS AND TISSUES: ICD-10-CM

## 2025-01-02 PROCEDURE — G2211 COMPLEX E/M VISIT ADD ON: CPT

## 2025-01-02 PROCEDURE — 99215 OFFICE O/P EST HI 40 MIN: CPT

## 2025-01-29 NOTE — H&P PST ADULT - RESPIRATORY RATE (BREATHS/MIN)
RN Yolande aware of intent to eval. Pt received semisupine +NAD +hep lock; pt left as found with all lines intact and needs met +call bell +bed alarm +RN aware.
16